# Patient Record
Sex: FEMALE | Race: OTHER | HISPANIC OR LATINO
[De-identification: names, ages, dates, MRNs, and addresses within clinical notes are randomized per-mention and may not be internally consistent; named-entity substitution may affect disease eponyms.]

---

## 2017-01-16 ENCOUNTER — APPOINTMENT (OUTPATIENT)
Dept: RADIOLOGY | Facility: CLINIC | Age: 42
End: 2017-01-16

## 2017-01-16 ENCOUNTER — OUTPATIENT (OUTPATIENT)
Dept: OUTPATIENT SERVICES | Facility: HOSPITAL | Age: 42
LOS: 1 days | End: 2017-01-16
Payer: COMMERCIAL

## 2017-01-16 DIAGNOSIS — Z98.89 OTHER SPECIFIED POSTPROCEDURAL STATES: Chronic | ICD-10-CM

## 2017-01-16 DIAGNOSIS — Z00.8 ENCOUNTER FOR OTHER GENERAL EXAMINATION: ICD-10-CM

## 2017-01-16 PROCEDURE — 71046 X-RAY EXAM CHEST 2 VIEWS: CPT

## 2018-04-10 ENCOUNTER — OUTPATIENT (OUTPATIENT)
Dept: OUTPATIENT SERVICES | Facility: HOSPITAL | Age: 43
LOS: 1 days | End: 2018-04-10
Payer: COMMERCIAL

## 2018-04-10 ENCOUNTER — APPOINTMENT (OUTPATIENT)
Dept: MAMMOGRAPHY | Facility: CLINIC | Age: 43
End: 2018-04-10
Payer: COMMERCIAL

## 2018-04-10 DIAGNOSIS — Z00.8 ENCOUNTER FOR OTHER GENERAL EXAMINATION: ICD-10-CM

## 2018-04-10 DIAGNOSIS — Z98.89 OTHER SPECIFIED POSTPROCEDURAL STATES: Chronic | ICD-10-CM

## 2018-04-10 PROCEDURE — 77063 BREAST TOMOSYNTHESIS BI: CPT | Mod: 26

## 2018-04-10 PROCEDURE — 77067 SCR MAMMO BI INCL CAD: CPT | Mod: 26

## 2018-04-10 PROCEDURE — 77063 BREAST TOMOSYNTHESIS BI: CPT

## 2018-04-10 PROCEDURE — 77067 SCR MAMMO BI INCL CAD: CPT

## 2018-05-08 ENCOUNTER — APPOINTMENT (OUTPATIENT)
Dept: VASCULAR SURGERY | Facility: CLINIC | Age: 43
End: 2018-05-08
Payer: COMMERCIAL

## 2018-05-08 VITALS
HEART RATE: 73 BPM | RESPIRATION RATE: 16 BRPM | HEIGHT: 59.5 IN | BODY MASS INDEX: 20.89 KG/M2 | OXYGEN SATURATION: 100 % | SYSTOLIC BLOOD PRESSURE: 99 MMHG | TEMPERATURE: 98.9 F | WEIGHT: 105 LBS | DIASTOLIC BLOOD PRESSURE: 63 MMHG

## 2018-05-08 VITALS — SYSTOLIC BLOOD PRESSURE: 101 MMHG | DIASTOLIC BLOOD PRESSURE: 66 MMHG

## 2018-05-08 DIAGNOSIS — Z78.9 OTHER SPECIFIED HEALTH STATUS: ICD-10-CM

## 2018-05-08 DIAGNOSIS — Z83.511 FAMILY HISTORY OF GLAUCOMA: ICD-10-CM

## 2018-05-08 DIAGNOSIS — K22.0 ACHALASIA OF CARDIA: ICD-10-CM

## 2018-05-08 PROCEDURE — 99204 OFFICE O/P NEW MOD 45 MIN: CPT

## 2018-05-08 PROCEDURE — 93970 EXTREMITY STUDY: CPT

## 2018-05-24 ENCOUNTER — APPOINTMENT (OUTPATIENT)
Dept: RADIOLOGY | Facility: CLINIC | Age: 43
End: 2018-05-24
Payer: COMMERCIAL

## 2018-05-24 ENCOUNTER — OUTPATIENT (OUTPATIENT)
Dept: OUTPATIENT SERVICES | Facility: HOSPITAL | Age: 43
LOS: 1 days | End: 2018-05-24
Payer: COMMERCIAL

## 2018-05-24 DIAGNOSIS — M54.2 CERVICALGIA: ICD-10-CM

## 2018-05-24 DIAGNOSIS — Z98.89 OTHER SPECIFIED POSTPROCEDURAL STATES: Chronic | ICD-10-CM

## 2018-05-24 PROCEDURE — 72050 X-RAY EXAM NECK SPINE 4/5VWS: CPT | Mod: 26

## 2018-05-24 PROCEDURE — 72050 X-RAY EXAM NECK SPINE 4/5VWS: CPT

## 2018-06-28 ENCOUNTER — OUTPATIENT (OUTPATIENT)
Dept: OUTPATIENT SERVICES | Facility: HOSPITAL | Age: 43
LOS: 1 days | End: 2018-06-28
Payer: COMMERCIAL

## 2018-06-28 ENCOUNTER — APPOINTMENT (OUTPATIENT)
Dept: MRI IMAGING | Facility: CLINIC | Age: 43
End: 2018-06-28
Payer: COMMERCIAL

## 2018-06-28 DIAGNOSIS — Z00.8 ENCOUNTER FOR OTHER GENERAL EXAMINATION: ICD-10-CM

## 2018-06-28 DIAGNOSIS — Z98.89 OTHER SPECIFIED POSTPROCEDURAL STATES: Chronic | ICD-10-CM

## 2018-06-28 PROCEDURE — 72141 MRI NECK SPINE W/O DYE: CPT | Mod: 26

## 2018-06-28 PROCEDURE — 72141 MRI NECK SPINE W/O DYE: CPT

## 2018-08-02 ENCOUNTER — EMERGENCY (EMERGENCY)
Facility: HOSPITAL | Age: 43
LOS: 1 days | Discharge: HOME | End: 2018-08-02
Attending: EMERGENCY MEDICINE
Payer: COMMERCIAL

## 2018-08-02 VITALS
SYSTOLIC BLOOD PRESSURE: 128 MMHG | TEMPERATURE: 98 F | HEART RATE: 77 BPM | DIASTOLIC BLOOD PRESSURE: 88 MMHG | RESPIRATION RATE: 18 BRPM | OXYGEN SATURATION: 100 %

## 2018-08-02 VITALS — WEIGHT: 102.96 LBS | HEIGHT: 60 IN

## 2018-08-02 DIAGNOSIS — Z98.89 OTHER SPECIFIED POSTPROCEDURAL STATES: Chronic | ICD-10-CM

## 2018-08-02 PROCEDURE — 70160 X-RAY EXAM OF NASAL BONES: CPT | Mod: 26

## 2018-08-02 PROCEDURE — 99283 EMERGENCY DEPT VISIT LOW MDM: CPT

## 2018-08-02 PROCEDURE — 70160 X-RAY EXAM OF NASAL BONES: CPT

## 2018-08-02 RX ORDER — ACETAMINOPHEN 500 MG
975 TABLET ORAL ONCE
Qty: 0 | Refills: 0 | Status: COMPLETED | OUTPATIENT
Start: 2018-08-02 | End: 2018-08-02

## 2018-08-02 RX ADMIN — Medication 975 MILLIGRAM(S): at 14:32
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
## 2018-08-02 NOTE — ED STATDOCS - PLAN OF CARE
Tylenol extra strength 2 tablets every 4 hours or Ibuprofen 600mg (3 tablets) every 6 hours as needed for aches, pains. Apply ice to affected area for 15-20 minutes every few hours for the next few days.  Use as much or as frequently as desired. Return immediately to the ER for re-evaluation if your symptoms recur or worsening.

## 2018-08-02 NOTE — ED ADULT NURSE NOTE - NSIMPLEMENTINTERV_GEN_ALL_ED
Implemented All Universal Safety Interventions:  [place] to call system. Call bell, personal items and telephone within reach. Instruct patient to call for assistance. Room bathroom lighting operational. Non-slip footwear when patient is off stretcher. Physically safe environment: no spills, clutter or unnecessary equipment. Stretcher in lowest position, wheels locked, appropriate side rails in place.

## 2018-08-02 NOTE — ED STATDOCS - OBJECTIVE STATEMENT
assuming an upright position after bending over and struck forehead/ nasal bridge on bottow side of a retratable  desktop.  no loc.  has pain across forehead and nasal bridge.  No headahce.  no nose bleeding. assuming an upright position after bending over and struck forehead/ nasal bridge on bottow side of a retratable  desktop.  no loc.  has pain across forehead and nasal bridge.  No headahce.  no nose bleeding.  last tet 3-4 yrs ago.

## 2018-08-02 NOTE — ED STATDOCS - CARE PLAN
Principal Discharge DX:	Contusion of nose, initial encounter  Assessment and plan of treatment:	Tylenol extra strength 2 tablets every 4 hours or Ibuprofen 600mg (3 tablets) every 6 hours as needed for aches, pains. Apply ice to affected area for 15-20 minutes every few hours for the next few days.  Use as much or as frequently as desired. Return immediately to the ER for re-evaluation if your symptoms recur or worsening.

## 2018-11-13 ENCOUNTER — APPOINTMENT (OUTPATIENT)
Dept: VASCULAR SURGERY | Facility: CLINIC | Age: 43
End: 2018-11-13
Payer: COMMERCIAL

## 2018-11-13 VITALS
TEMPERATURE: 98.5 F | SYSTOLIC BLOOD PRESSURE: 107 MMHG | DIASTOLIC BLOOD PRESSURE: 70 MMHG | HEIGHT: 59 IN | OXYGEN SATURATION: 100 % | HEART RATE: 78 BPM | WEIGHT: 104 LBS | BODY MASS INDEX: 20.96 KG/M2

## 2018-11-13 PROCEDURE — 36471 NJX SCLRSNT MLT INCMPTNT VN: CPT | Mod: 50

## 2018-11-26 ENCOUNTER — EMERGENCY (EMERGENCY)
Facility: HOSPITAL | Age: 43
LOS: 1 days | Discharge: HOME | End: 2018-11-26
Attending: EMERGENCY MEDICINE
Payer: COMMERCIAL

## 2018-11-26 VITALS
OXYGEN SATURATION: 98 % | SYSTOLIC BLOOD PRESSURE: 128 MMHG | DIASTOLIC BLOOD PRESSURE: 85 MMHG | HEIGHT: 60 IN | WEIGHT: 102.96 LBS | HEART RATE: 77 BPM | TEMPERATURE: 98 F | RESPIRATION RATE: 18 BRPM

## 2018-11-26 DIAGNOSIS — Z98.89 OTHER SPECIFIED POSTPROCEDURAL STATES: Chronic | ICD-10-CM

## 2018-11-26 PROCEDURE — 99283 EMERGENCY DEPT VISIT LOW MDM: CPT | Mod: 25

## 2018-11-26 PROCEDURE — 73080 X-RAY EXAM OF ELBOW: CPT

## 2018-11-26 PROCEDURE — 99283 EMERGENCY DEPT VISIT LOW MDM: CPT

## 2018-11-26 PROCEDURE — 73080 X-RAY EXAM OF ELBOW: CPT | Mod: 26,LT

## 2018-11-26 RX ORDER — IBUPROFEN 200 MG
600 TABLET ORAL ONCE
Qty: 0 | Refills: 0 | Status: COMPLETED | OUTPATIENT
Start: 2018-11-26 | End: 2018-11-26

## 2018-11-26 RX ORDER — OMEPRAZOLE 10 MG/1
0 CAPSULE, DELAYED RELEASE ORAL
Qty: 0 | Refills: 0 | COMMUNITY

## 2018-11-26 RX ADMIN — Medication 600 MILLIGRAM(S): at 23:54

## 2018-11-26 NOTE — ED ADULT NURSE NOTE - NSIMPLEMENTINTERV_GEN_ALL_ED
Implemented All Fall Risk Interventions:  [place] to call system. Call bell, personal items and telephone within reach. Instruct patient to call for assistance. Room bathroom lighting operational. Non-slip footwear when patient is off stretcher. Physically safe environment: no spills, clutter or unnecessary equipment. Stretcher in lowest position, wheels locked, appropriate side rails in place. Provide visual cue, wrist band, yellow gown, etc. Monitor gait and stability. Monitor for mental status changes and reorient to person, place, and time. Review medications for side effects contributing to fall risk. Reinforce activity limits and safety measures with patient and family.

## 2018-11-26 NOTE — ED ADULT TRIAGE NOTE - CHIEF COMPLAINT QUOTE
Pt with fall down 7 stairs after slipping due to socks. Pt c/o left elbow pain and left hip/buttock pain and pt is noted to have ecchymosis. Pt denies LOC, hitting head. Pt playful upon triage.

## 2018-11-26 NOTE — ED PROVIDER NOTE - OBJECTIVE STATEMENT
PT with no SPMHx presents to the ED with complaint of LT elbow pain and  lt buttock pain s/p fall. PT states that she was home and sliped on the stairs and fell down 6 stairs landing on her bottom and slipped down the rest banging against each step. PT states that she had a sudden onset of non radiating pain. PT states that they have not done anything for the pain prior to coming to the ED. pt states that pain is moderate in nature made worse with walking and moving. PT denies head trama, LOC, n/v, change in vision, fever, chills, numbness tingling, loss of sensation saddle ansatiesi, weakness, HE, loss of control of urine, or bowels IVDA.

## 2018-11-26 NOTE — ED ADULT NURSE NOTE - OBJECTIVE STATEMENT
Assumed care of pt at 2230. Pt AOx4 in no acute distress. Pt c/o left elbow pain and left hip/buttocks pain 5/10. Pt reports she fell down 7 stairs around 2200. Pt states she lost her footing and slipped while wearing socks. Pt denies hitting head, loc, or use of blood thinners.

## 2018-11-26 NOTE — ED PROVIDER NOTE - NS ED ROS FT
ROS: CONTUSIONAL: Denies fever, chills, fatigue, wt loss. HEAD: Denies trauma, HA, Dizziness. EYE: Denies Acute visual changes, diplopia. ENMT: Denies change in hearing, tinnitus, epistaxis, difficulty swallowing, sore throat. CARDIO: Denies CP, palpitations, edema. RESP: Denies Cough, SOB , Diff breathing, hemoptysis. GI: Denies N/V, ABD pain, change in bowel movement. URINARY: Denies difficulty urinating, pelvic pain. MS:  Denies , back pain, weakness, decreased ROM, swelling. NEURO: Denies change in gait, seizures, loss of sensation, dizziness, confusion LOC.  PSY: NO SI/HI.

## 2018-11-26 NOTE — ED PROVIDER NOTE - LOCATION
ttp LT buttock TYRON at hip, strength intact. TTP LT hip no visible abnormality TYRON, pain with rand and tp palpitation

## 2019-04-24 ENCOUNTER — OUTPATIENT (OUTPATIENT)
Dept: OUTPATIENT SERVICES | Facility: HOSPITAL | Age: 44
LOS: 1 days | End: 2019-04-24
Payer: COMMERCIAL

## 2019-04-24 ENCOUNTER — APPOINTMENT (OUTPATIENT)
Dept: MAMMOGRAPHY | Facility: CLINIC | Age: 44
End: 2019-04-24
Payer: COMMERCIAL

## 2019-04-24 DIAGNOSIS — Z12.31 ENCOUNTER FOR SCREENING MAMMOGRAM FOR MALIGNANT NEOPLASM OF BREAST: ICD-10-CM

## 2019-04-24 DIAGNOSIS — Z98.89 OTHER SPECIFIED POSTPROCEDURAL STATES: Chronic | ICD-10-CM

## 2019-04-24 PROCEDURE — 77067 SCR MAMMO BI INCL CAD: CPT | Mod: 26

## 2019-04-24 PROCEDURE — 77063 BREAST TOMOSYNTHESIS BI: CPT

## 2019-04-24 PROCEDURE — 77063 BREAST TOMOSYNTHESIS BI: CPT | Mod: 26

## 2019-04-24 PROCEDURE — 77067 SCR MAMMO BI INCL CAD: CPT

## 2019-05-03 ENCOUNTER — OUTPATIENT (OUTPATIENT)
Dept: OUTPATIENT SERVICES | Facility: HOSPITAL | Age: 44
LOS: 1 days | End: 2019-05-03
Payer: COMMERCIAL

## 2019-05-03 ENCOUNTER — APPOINTMENT (OUTPATIENT)
Dept: ULTRASOUND IMAGING | Facility: CLINIC | Age: 44
End: 2019-05-03
Payer: COMMERCIAL

## 2019-05-03 DIAGNOSIS — Z98.89 OTHER SPECIFIED POSTPROCEDURAL STATES: Chronic | ICD-10-CM

## 2019-05-03 DIAGNOSIS — R92.8 OTHER ABNORMAL AND INCONCLUSIVE FINDINGS ON DIAGNOSTIC IMAGING OF BREAST: ICD-10-CM

## 2019-05-03 PROCEDURE — 76642 ULTRASOUND BREAST LIMITED: CPT | Mod: 26,RT

## 2019-05-03 PROCEDURE — 76642 ULTRASOUND BREAST LIMITED: CPT

## 2019-05-28 ENCOUNTER — APPOINTMENT (OUTPATIENT)
Dept: VASCULAR SURGERY | Facility: CLINIC | Age: 44
End: 2019-05-28
Payer: COMMERCIAL

## 2019-05-28 VITALS
TEMPERATURE: 98.2 F | BODY MASS INDEX: 20.96 KG/M2 | HEART RATE: 78 BPM | SYSTOLIC BLOOD PRESSURE: 115 MMHG | OXYGEN SATURATION: 97 % | WEIGHT: 104 LBS | DIASTOLIC BLOOD PRESSURE: 79 MMHG | HEIGHT: 59 IN

## 2019-05-28 DIAGNOSIS — M79.604 PAIN IN RIGHT LEG: ICD-10-CM

## 2019-05-28 DIAGNOSIS — M79.605 PAIN IN RIGHT LEG: ICD-10-CM

## 2019-05-28 PROCEDURE — 99213 OFFICE O/P EST LOW 20 MIN: CPT

## 2019-05-28 PROCEDURE — 93970 EXTREMITY STUDY: CPT

## 2019-09-16 NOTE — PHYSICAL EXAM
[JVD] : no jugular venous distention  [Carotid Bruits] : no carotid bruits [Normal Breath Sounds] : Normal breath sounds [Normal Heart Sounds] : normal heart sounds [Right Carotid Bruit] : no bruit heard over the right carotid [Left Carotid Bruit] : no bruit heard over the left carotid [2+] : left 2+ [Ankle Swelling (On Exam)] : present [Ankle Swelling Bilaterally] : bilaterally  [Varicose Veins Of Lower Extremities] : present [Ankle Swelling On The Right] : mild [] : bilaterally [de-identified] : awake alert [Ankle Swelling On The Left] : moderate [de-identified] : MOM, PERRLA [de-identified] : bulging veins, no cellulitis

## 2019-09-16 NOTE — PROCEDURE
[FreeTextEntry1] : after informed consent obtained under aseptic technique 1% polidocanol used to inject all varicose veins of both legs. A sterile pressure dressing was applied. The patient tolerated well.

## 2019-09-16 NOTE — HISTORY OF PRESENT ILLNESS
[FreeTextEntry1] : The patient is a 43 y/o female with painful varicose veins to legs. She states they have been present for 1-1/2 years. 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 Pain begins shortly after awakening and worsens throughout the day. She states pain is burning and ache.  She tried compression stockings for a year and feels as though they worsened the appearance of the varicose veins.  She has them scattered through her thighs and calves. She has had 1 child, is a non smoker. She is active and elevates her legs at rest. She maintains adequate hydration. She states her mother and maternal grandfather also had severe varicose veins.

## 2019-09-16 NOTE — ASSESSMENT
[FreeTextEntry1] : The patient is a 41 y/o female with BLE leg pain and visible varicose veins. In the past compression stockings has worsened symptoms. U/S Venous Duplex 5/8/18 was negative for reflux and venous insufficiency. Because of varicose veins with incompetent tributaries will require sclerotherapy. Tolerated well. Veins improved.\par \par -Continue compression \par -RTC as needed

## 2020-03-06 ENCOUNTER — OUTPATIENT (OUTPATIENT)
Dept: OUTPATIENT SERVICES | Facility: HOSPITAL | Age: 45
LOS: 1 days | End: 2020-03-06
Payer: COMMERCIAL

## 2020-03-06 ENCOUNTER — APPOINTMENT (OUTPATIENT)
Dept: ULTRASOUND IMAGING | Facility: CLINIC | Age: 45
End: 2020-03-06
Payer: COMMERCIAL

## 2020-03-06 DIAGNOSIS — Z98.89 OTHER SPECIFIED POSTPROCEDURAL STATES: Chronic | ICD-10-CM

## 2020-03-06 DIAGNOSIS — Z00.00 ENCOUNTER FOR GENERAL ADULT MEDICAL EXAMINATION WITHOUT ABNORMAL FINDINGS: ICD-10-CM

## 2020-03-06 PROCEDURE — 93971 EXTREMITY STUDY: CPT | Mod: 26,RT

## 2020-03-06 PROCEDURE — 93971 EXTREMITY STUDY: CPT

## 2020-03-30 ENCOUNTER — EMERGENCY (EMERGENCY)
Facility: HOSPITAL | Age: 45
LOS: 1 days | Discharge: HOME | End: 2020-03-30
Attending: EMERGENCY MEDICINE
Payer: COMMERCIAL

## 2020-03-30 VITALS — WEIGHT: 102.96 LBS | RESPIRATION RATE: 18 BRPM | OXYGEN SATURATION: 99 % | HEIGHT: 60 IN | HEART RATE: 90 BPM

## 2020-03-30 VITALS — SYSTOLIC BLOOD PRESSURE: 116 MMHG | TEMPERATURE: 98 F | DIASTOLIC BLOOD PRESSURE: 79 MMHG

## 2020-03-30 DIAGNOSIS — Z98.89 OTHER SPECIFIED POSTPROCEDURAL STATES: Chronic | ICD-10-CM

## 2020-03-30 PROCEDURE — 73030 X-RAY EXAM OF SHOULDER: CPT | Mod: 26,LT

## 2020-03-30 PROCEDURE — 99284 EMERGENCY DEPT VISIT MOD MDM: CPT

## 2020-03-30 PROCEDURE — 73060 X-RAY EXAM OF HUMERUS: CPT | Mod: 26,LT

## 2020-03-30 PROCEDURE — 73060 X-RAY EXAM OF HUMERUS: CPT

## 2020-03-30 PROCEDURE — 73030 X-RAY EXAM OF SHOULDER: CPT

## 2020-03-30 RX ORDER — IBUPROFEN 200 MG
600 TABLET ORAL ONCE
Refills: 0 | Status: COMPLETED | OUTPATIENT
Start: 2020-03-30 | End: 2020-03-30

## 2020-03-30 RX ADMIN — Medication 600 MILLIGRAM(S): at 16:37
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
## 2020-03-30 NOTE — ED PROVIDER NOTE - PHYSICAL EXAMINATION
Constitutional - well-developed; well nourished. Head - NCAT. Airway patent. Eyes - PERRL. CV - RRR. no murmur. no edema. Pulm - CTAB. Abd - soft, nt. no rebound. no guarding. Neuro - A&Ox3. strength 5/5 x4. sensation intact x4. normal gait. Skin - No rash. MSK - LROM to L upper shoulder, TTP along L AC, skin intact, warm and dry, cap refill < 2sec, active ROM to 90 degrees, no deformity, strength and sensation intact.
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
## 2020-03-30 NOTE — ED PROVIDER NOTE - NS ED ROS FT
No fever/chills, No photophobia/eye pain/changes in vision, No ear pain/sore throat/dysphagia, No chest pain/palpitations, no SOB/cough/wheeze/stridor, No abdominal pain, No N/V/D, no dysuria/frequency/discharge, L shoulder pain. No neck/back pain, no rash, no changes in neurological status/function.

## 2020-03-30 NOTE — ED PROVIDER NOTE - PATIENT PORTAL LINK FT
You can access the FollowMyHealth Patient Portal offered by [facility] by registering at the following website: [email or web address]. By joining [employer]’s FollowMyHealth portal, you will also be able to view your health information using other applications (apps) compatible with our system.

## 2020-04-06 ENCOUNTER — APPOINTMENT (OUTPATIENT)
Dept: ORTHOPEDIC SURGERY | Facility: CLINIC | Age: 45
End: 2020-04-06
Payer: OTHER MISCELLANEOUS

## 2020-04-06 VITALS — WEIGHT: 104 LBS | BODY MASS INDEX: 20.96 KG/M2 | TEMPERATURE: 98.1 F | HEIGHT: 59 IN

## 2020-04-06 PROCEDURE — 99204 OFFICE O/P NEW MOD 45 MIN: CPT

## 2020-04-10 ENCOUNTER — APPOINTMENT (OUTPATIENT)
Dept: ORTHOPEDIC SURGERY | Facility: CLINIC | Age: 45
End: 2020-04-10

## 2020-04-26 ENCOUNTER — MESSAGE (OUTPATIENT)
Age: 45
End: 2020-04-26

## 2020-05-05 ENCOUNTER — APPOINTMENT (OUTPATIENT)
Dept: ORTHOPEDIC SURGERY | Facility: CLINIC | Age: 45
End: 2020-05-05

## 2020-05-08 ENCOUNTER — APPOINTMENT (OUTPATIENT)
Dept: ORTHOPEDIC SURGERY | Facility: CLINIC | Age: 45
End: 2020-05-08
Payer: OTHER MISCELLANEOUS

## 2020-05-08 VITALS
DIASTOLIC BLOOD PRESSURE: 85 MMHG | HEART RATE: 83 BPM | BODY MASS INDEX: 21.01 KG/M2 | HEIGHT: 60 IN | WEIGHT: 107 LBS | SYSTOLIC BLOOD PRESSURE: 137 MMHG

## 2020-05-08 PROCEDURE — 99214 OFFICE O/P EST MOD 30 MIN: CPT

## 2020-05-08 RX ORDER — METHYLPREDNISOLONE 4 MG/1
4 TABLET ORAL
Qty: 1 | Refills: 0 | Status: COMPLETED | COMMUNITY
Start: 2020-05-08 | End: 2020-05-14
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
## 2020-05-08 NOTE — CONSULT LETTER
[Dear  ___] : Dear ~LUDA, [Consult Letter:] : I had the pleasure of evaluating your patient, [unfilled]. [Please see my note below.] : Please see my note below. [Consult Closing:] : Thank you very much for allowing me to participate in the care of this patient.  If you have any questions, please do not hesitate to contact me. [Sincerely,] : Sincerely, [FreeTextEntry2] : BREANNA BANKS [FreeTextEntry3] : Fernando Madison DO.\par Sports Medicine \par \par Orthopaedic Surgery\par \par [facility]\par \par [address] \par [address]\[place] [address]\par \par [address] [facility] \[address] \[place] [address]\par \par Tel [phone]\par Fax [phone]\par \par
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
## 2020-05-08 NOTE — PHYSICAL EXAM
[de-identified] : Physical Exam:\par General: Well appearing, no acute distress\par Neurologic: A&Ox3, No focal deficits\par Head: NCAT without abrasions, lacerations, or ecchymosis to head, face, or scalp\par Eyes: No scleral icterus, no gross abnormalities\par Respiratory: Equal chest wall expansion bilaterally, no accessory muscle use\par Lymphatic: No lymphadenopathy palpated\par Skin: Warm and dry\par Psychiatric: Normal mood and affect\par \par \par Difficult spine: Limited active range of motion in forward flexion extension rotation and side bending significant pain with attempted passive motion in all planes.  Slightly decreased sensation at all fingertips but does not follow specific dermatome.  Reflexes at C5-C6-C7 are normal.  Motor and sensation are intact throughout C5-T1.\par Left Shoulder\par ·	Inspection/Palpation: no tenderness, swelling or deformities\par ·	Range of Motion: no crepitus with ROM; Active FF 90; ER at side 25; IR to belly; Passive FF 130ER at side 45; IR to belly\par ·	Strength: forward elevation in scapular plane [4/5], internal rotation [4/5], external rotation [4/5], adduction [4/5] and abduction [4/5]\par ·	Stability: no joint instability on provocative testing\par ·	Tests: Rutherford test positive, Neer positive, positive drop arm test secondary to pain, bear hug test positive, Napolean sign negative, cross arm adduction negative, lift off sign positive, hornblowers sign negative, speeds test negative, Yergason's test negative, no bicipital groove tenderness, Blount's Active Compression test negative, whipple test positive bicep's load II test negative\par \par Right Shoulder\par ·	Inspection/Palpation: no tenderness, swelling or deformities\par ·	Range of Motion: full and painless in all planes, no crepitus\par ·	Strength: forward elevation in scapular plane 5/5, internal rotation 5/5, external rotation 5/5, adduction 5/5 and abduction 5/5\par ·	Stability: no joint instability on provocative testing\par ·	Tests: Rutherford test negative, Neer sign negative, negative drop arm test secondary to pain, bear hug test negative, Napolean sign negative, cross arm adduction negative, lift off sign positive, hornblowers sign negative, speeds test negative, Yergason's test negative, no bicipital groove tenderness, Blount's Active Compression test negative [de-identified] : The following radiographs were ordered and read by me during this patients visit. I reviewed each radiograph in detail with the patient and discussed the findings as highlighted below. \par \par 4 views of the left shoulder show no fracture, dislocation or bony lesions. There is no evidence of degenerative change in the glenohumeral and acromioclavicular joints with maintenance of the joint space. Otherwise unremarkable.

## 2020-05-20 ENCOUNTER — APPOINTMENT (OUTPATIENT)
Dept: ORTHOPEDIC SURGERY | Facility: CLINIC | Age: 45
End: 2020-05-20
Payer: OTHER MISCELLANEOUS

## 2020-05-20 VITALS
TEMPERATURE: 97.6 F | SYSTOLIC BLOOD PRESSURE: 136 MMHG | DIASTOLIC BLOOD PRESSURE: 81 MMHG | HEIGHT: 60 IN | HEART RATE: 109 BPM | BODY MASS INDEX: 21.01 KG/M2 | WEIGHT: 107 LBS

## 2020-05-20 PROCEDURE — 20610 DRAIN/INJ JOINT/BURSA W/O US: CPT | Mod: LT

## 2020-05-20 PROCEDURE — 99214 OFFICE O/P EST MOD 30 MIN: CPT | Mod: 25

## 2020-05-22 ENCOUNTER — OUTPATIENT (OUTPATIENT)
Dept: OUTPATIENT SERVICES | Facility: HOSPITAL | Age: 45
LOS: 1 days | End: 2020-05-22
Payer: COMMERCIAL

## 2020-05-22 ENCOUNTER — RESULT REVIEW (OUTPATIENT)
Age: 45
End: 2020-05-22

## 2020-05-22 ENCOUNTER — APPOINTMENT (OUTPATIENT)
Dept: RADIOLOGY | Facility: CLINIC | Age: 45
End: 2020-05-22
Payer: COMMERCIAL

## 2020-05-22 DIAGNOSIS — Z00.00 ENCOUNTER FOR GENERAL ADULT MEDICAL EXAMINATION WITHOUT ABNORMAL FINDINGS: ICD-10-CM

## 2020-05-22 DIAGNOSIS — Z98.89 OTHER SPECIFIED POSTPROCEDURAL STATES: Chronic | ICD-10-CM

## 2020-05-22 PROCEDURE — 71046 X-RAY EXAM CHEST 2 VIEWS: CPT | Mod: 26

## 2020-05-22 PROCEDURE — 71046 X-RAY EXAM CHEST 2 VIEWS: CPT

## 2020-06-04 ENCOUNTER — NON-APPOINTMENT (OUTPATIENT)
Age: 45
End: 2020-06-04

## 2020-06-12 ENCOUNTER — APPOINTMENT (OUTPATIENT)
Dept: ORTHOPEDIC SURGERY | Facility: CLINIC | Age: 45
End: 2020-06-12
Payer: OTHER MISCELLANEOUS

## 2020-06-12 ENCOUNTER — APPOINTMENT (OUTPATIENT)
Dept: ORTHOPEDIC SURGERY | Facility: CLINIC | Age: 45
End: 2020-06-12

## 2020-06-12 VITALS
DIASTOLIC BLOOD PRESSURE: 83 MMHG | BODY MASS INDEX: 20.62 KG/M2 | WEIGHT: 105 LBS | HEIGHT: 60 IN | HEART RATE: 85 BPM | TEMPERATURE: 97.9 F | SYSTOLIC BLOOD PRESSURE: 139 MMHG

## 2020-06-12 PROCEDURE — 99214 OFFICE O/P EST MOD 30 MIN: CPT

## 2020-06-12 PROCEDURE — 72040 X-RAY EXAM NECK SPINE 2-3 VW: CPT

## 2020-06-12 NOTE — PHYSICAL EXAM
[de-identified] : CONSTITUTIONAL:  Patient is a very pleasant individual who is well-nourished and appears stated age. \par PSYCHIATRIC:  Alert and oriented times three and in no apparent distress, and participates with orthopedic evaluation well.\par HEAD:  Atraumatic and  nonsyndromic in appearance.\par EENT: No thyromegaly, EOMI.\par RESPIRATORY:  Respiratory rate is regular, not dyspneic on examination.\par LYMPHATICS:  There is no cervical or axillary lymphadenopathy.\par INTEGUMENTARY:  Skin is clean, dry, and intact about the bilateral upper extremities and cervical spine. \par VASCULAR:   There is brisk capillary refill about the bilateral upper extremities and radial pulses are 2/4. \par NEUROLOGIC:  Negative L'hirmitte, negative Spurling's sign. There are no pathologic reflexes. There is no decrease in sensation of the bilateral upper extremities on Wartenberg pinwheel/manual examination.  Deep tendon reflexes are well-maintained at +2/4 of the bilateral upper extremities and are symmetric. Subjective paresthesias in the tipsof the index middle and ring finger on the left\par MUSCULOSKELETAL:  There is no visible muscular atrophy.  Manual motor strength is well maintained in the bilateral upper extremities.  Cervical range of motion is well maintained.  The patient ambulates in a non-myelopathic manner. Normal secondary orthopaedic exam of bilateral shoulders, elbows and hands.  Elbow flexion and extension, wrist extension, finger flexion and abduction are well maintained.\par Cervical myositis with palpation\par   [de-identified] : Cervical MRI has been [facility] demonstrates very minimal cervical spondylosis. I would not recommend operative management.\par \par X-rays of the cervical spine radiating office that shows normal cervical x-ray
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
## 2020-06-12 NOTE — DISCUSSION/SUMMARY
[de-identified] : based upon her MRI and x-ray findings I would not recommend surgical management at this point in time I think this patient can be best treated with physical modalities such as physical therapy. I also think a physiatry evaluation for nonoperative maximization is reasonable. Patient can followup on an as needed basis given the fact that there is no surgical indications at this point in time

## 2020-06-12 NOTE — REASON FOR VISIT
[Initial Visit] : an initial visit for [Neck Pain] : neck pain [Worker's Compensation] : this visit is related to worker's compensation [FreeTextEntry2] : Cervical pain

## 2020-06-24 ENCOUNTER — APPOINTMENT (OUTPATIENT)
Dept: ORTHOPEDIC SURGERY | Facility: CLINIC | Age: 45
End: 2020-06-24

## 2020-06-25 ENCOUNTER — APPOINTMENT (OUTPATIENT)
Dept: ORTHOPEDIC SURGERY | Facility: CLINIC | Age: 45
End: 2020-06-25
Payer: OTHER MISCELLANEOUS

## 2020-06-25 VITALS
DIASTOLIC BLOOD PRESSURE: 68 MMHG | SYSTOLIC BLOOD PRESSURE: 110 MMHG | HEIGHT: 60 IN | WEIGHT: 105 LBS | HEART RATE: 90 BPM | BODY MASS INDEX: 20.62 KG/M2

## 2020-06-25 VITALS — TEMPERATURE: 98.4 F

## 2020-06-25 PROCEDURE — 99214 OFFICE O/P EST MOD 30 MIN: CPT

## 2020-06-25 RX ORDER — NAPROXEN 500 MG/1
500 TABLET ORAL
Qty: 42 | Refills: 0 | Status: COMPLETED | COMMUNITY
Start: 2020-06-25 | End: 2020-07-16

## 2020-06-28 ENCOUNTER — FORM ENCOUNTER (OUTPATIENT)
Age: 45
End: 2020-06-28

## 2020-07-15 ENCOUNTER — FORM ENCOUNTER (OUTPATIENT)
Age: 45
End: 2020-07-15

## 2020-07-29 ENCOUNTER — APPOINTMENT (OUTPATIENT)
Dept: ORTHOPEDIC SURGERY | Facility: CLINIC | Age: 45
End: 2020-07-29

## 2020-08-12 ENCOUNTER — NON-APPOINTMENT (OUTPATIENT)
Age: 45
End: 2020-08-12

## 2020-08-21 ENCOUNTER — APPOINTMENT (OUTPATIENT)
Dept: ORTHOPEDIC SURGERY | Facility: CLINIC | Age: 45
End: 2020-08-21
Payer: OTHER MISCELLANEOUS

## 2020-08-21 VITALS
WEIGHT: 105 LBS | HEIGHT: 60 IN | HEART RATE: 85 BPM | BODY MASS INDEX: 20.62 KG/M2 | TEMPERATURE: 98.4 F | DIASTOLIC BLOOD PRESSURE: 89 MMHG | SYSTOLIC BLOOD PRESSURE: 128 MMHG

## 2020-08-21 DIAGNOSIS — S49.92XA UNSPECIFIED INJURY OF LEFT SHOULDER AND UPPER ARM, INITIAL ENCOUNTER: ICD-10-CM

## 2020-08-21 DIAGNOSIS — M47.812 SPONDYLOSIS W/OUT MYELOPATHY OR RADICULOPATHY, CERVICAL REGION: ICD-10-CM

## 2020-08-21 PROCEDURE — 99214 OFFICE O/P EST MOD 30 MIN: CPT

## 2020-08-21 PROCEDURE — 73030 X-RAY EXAM OF SHOULDER: CPT | Mod: LT

## 2020-08-21 RX ORDER — NAPROXEN 500 MG/1
500 TABLET ORAL
Qty: 60 | Refills: 2 | Status: COMPLETED | COMMUNITY
Start: 2020-08-21 | End: 2020-11-19

## 2020-08-26 ENCOUNTER — APPOINTMENT (OUTPATIENT)
Dept: PHYSICAL MEDICINE AND REHAB | Facility: CLINIC | Age: 45
End: 2020-08-26
Payer: OTHER MISCELLANEOUS

## 2020-08-26 VITALS
DIASTOLIC BLOOD PRESSURE: 93 MMHG | HEIGHT: 72 IN | WEIGHT: 105 LBS | SYSTOLIC BLOOD PRESSURE: 138 MMHG | BODY MASS INDEX: 14.22 KG/M2

## 2020-08-26 PROCEDURE — 99203 OFFICE O/P NEW LOW 30 MIN: CPT | Mod: 25

## 2020-08-26 PROCEDURE — 20553 NJX 1/MLT TRIGGER POINTS 3/>: CPT

## 2020-08-26 RX ORDER — LIDOCAINE 5 G/100G
5 OINTMENT TOPICAL
Qty: 1 | Refills: 2 | Status: COMPLETED | COMMUNITY
Start: 2020-08-26

## 2020-08-26 RX ORDER — LIDOCAINE 5 G/100G
5 OINTMENT TOPICAL
Qty: 1 | Refills: 2 | Status: ACTIVE | COMMUNITY
Start: 2020-08-26 | End: 1900-01-01

## 2020-08-26 RX ORDER — MELOXICAM 7.5 MG/1
7.5 TABLET ORAL DAILY
Qty: 14 | Refills: 0 | Status: DISCONTINUED | COMMUNITY
Start: 2020-04-06 | End: 2020-08-26

## 2020-08-26 NOTE — PHYSICAL EXAM
[FreeTextEntry1] : PE:\par Gen: In NAD, calm and cooperative\par HEENT: NCAT, Anicteric sclera\par Cardio: Warm extremities, no peripheral edema\par Respiratory: Normal respiratory effort on room air\par GI: No gross abdominal distension\par Skin: no rashes seen on exposed skin\par Psych: Normal affect, intact judgment and insight\par Neuro: Awake, alert and oriented x 3, CN 2-12 grossly intact\par MSK (Neck):\par 	Inspection: no gross swelling identified\par 	Palpation: Tenderness of the left lower cervical paraspinal muscles, and left upper back region, multiple trigger points identified\par 	ROM: Limited exam due to pain, Pain at end cervical extension/flexion/lateral rotation\par 	Strength: 4+/5 strength in LUE due to pain, otherwise 5/5 strength\par 	Reflexes: 2+ Biceps/Triceps/Brachioradialis reflex bilaterally, Posadas’s negative bilaterally\par 	Sensation: Intact to light touch in bilateral upper extremities\par 	Special tests: Spurling’s test negative bilaterally, neer positive on left, lift off positive on left, speed negative on left\par \par
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
## 2020-08-26 NOTE — ASSESSMENT
[FreeTextEntry1] : Ms. Mike is a 45F s/p workplace incident in which she suffers from persistent left shoulder/neck pain, with multiple trigger points identified on physical exam likely secondary to muscle spasms. Patients pain is less likely related to the disc herniation found on MRI C-Spine due to mostly non-radicular pain and nondermatomal distribution of this pain. \par \par -TPI done today\par -OTC TENS machine recommended, information given\par -Will give topical Lidocaine 5% cream, to use 3-4x/day PRN. If not approved, patient told she can get OTC Lidocaine 4% cream\par -Recommend restarting PT, patient currently appealing stoppage of PT. Patient would likely benefit from PT given TPI done today and relief while in program.\par -Gave note about being unable to work for 1 month\par - RTC in 1 month, anticipate return to work around that time

## 2020-08-26 NOTE — DATA REVIEWED
[MRI] : MRI [FreeTextEntry1] : Reviewed both cervical and L shoulder MRIs. MRI C-Spine raveled broad-based center disc herniation at C5-6 with no significant spinal canal or foraminal stenosis.

## 2020-08-26 NOTE — PROCEDURE
[de-identified] : Trigger Point Injections:\par The risks and benefits of the procedure were discussed with the patient and the patient verbally consented to the procedure. Chloroprep was used to prep the area. A 1.5 inch 25g needle was then inserted and a total of 5cc of Lidocaine 1% was injected into five separate trigger points (1cc each) of the trapezius, levator scapulae and rhomboid muscles on the left. The patient tolerated the procedure well without immediate complications.\par

## 2020-09-30 ENCOUNTER — APPOINTMENT (OUTPATIENT)
Dept: PHYSICAL MEDICINE AND REHAB | Facility: CLINIC | Age: 45
End: 2020-09-30
Payer: OTHER MISCELLANEOUS

## 2020-09-30 VITALS — TEMPERATURE: 98 F

## 2020-09-30 PROCEDURE — 99214 OFFICE O/P EST MOD 30 MIN: CPT

## 2020-09-30 RX ORDER — LIDOCAINE 5 G/100G
5 OINTMENT TOPICAL
Qty: 1 | Refills: 2 | Status: ACTIVE | COMMUNITY
Start: 2020-09-30 | End: 1900-01-01

## 2020-09-30 NOTE — REVIEW OF SYSTEMS
[Negative] : Heme/Lymph [FreeTextEntry9] : left>right neck/upper back pain [de-identified] : Intermittent numbness just at fingertips on left hand, nothing down arm

## 2020-09-30 NOTE — ASSESSMENT
[FreeTextEntry1] : Ms. Mike is a 45F who presents for follow up for likely post-traumatic myofascial neck/upper back pain, less likely cervical radiculopathy. She did well with the TPI but still has occasional muscle spasms at night. Will try cyclobenzaprine 5mg Qhs. Patient cleared to return to work for light duty as to not exacerbate her current symptoms. Patient also given Rx for Lidocaine cream as that has helped recently. Patient may return to me as needed. She is in agreement to this plan.\par

## 2020-09-30 NOTE — PHYSICAL EXAM
[FreeTextEntry1] : PE:\par Constitutional: In NAD, calm and cooperative\par HEENT: NCAT, Anicteric sclera, no lid-lag\par Cardio: Extremities appear pink and well perfused\par Respiratory: Normal respiratory effort on room air, no accessory muscle use\par Skin: no rashes seen on exposed skin, no visible abrasions\par Psych: Normal affect, intact judgment and insight\par Neuro: Awake, alert and oriented x 3, see below for focused neurological exam\par MSK (Neck):\par 	Inspection: no gross swelling identified\par 	Palpation: Mild Tenderness of the left lower cervical paraspinals\par 	ROM: Pain at end cervical extension/flexion\par 	Strength: Grossly 5/5 strength in bilateral upper extremities\par 	Sensation: Intact to light touch in bilateral upper extremities\par 	Special tests: Spurling’s test negative bilaterally

## 2020-09-30 NOTE — HISTORY OF PRESENT ILLNESS
[FreeTextEntry1] : Ms. WOLF LLAMAS  is a 45 year year old female who presents for f/u for left sided neck pain. Last seen as initial visit on 8/26/20 where she was given TPI, topical lidocaine, restarted on PT and information on OTC TENS machine. Patient says overall she is much better. Her worse problem now is upper neck spasms that only occur at night, but not nearly as bad as she was at last visit. She says PT was never approved so she never went. \par \par Onset: March 30th, incident with patient as above\par Provocation/Palliative: Turning her head left and right makes pain worse, pain is improved when laying down on side with pillow\par Quality: Tightness/burning sensation, but mainly spasms in the left upper back/neck region\par Radiation: Pain can radiate down the left side of neck into anterior left shoulder. \par Severity:6/10 right now, highest pain 7/10\par Timing: Pain improved with PT, especially with hot compress and TENS unit\par \par \par

## 2020-10-01 ENCOUNTER — OUTPATIENT (OUTPATIENT)
Dept: OUTPATIENT SERVICES | Facility: HOSPITAL | Age: 45
LOS: 1 days | End: 2020-10-01
Payer: COMMERCIAL

## 2020-10-01 ENCOUNTER — APPOINTMENT (OUTPATIENT)
Dept: MAMMOGRAPHY | Facility: CLINIC | Age: 45
End: 2020-10-01
Payer: COMMERCIAL

## 2020-10-01 ENCOUNTER — APPOINTMENT (OUTPATIENT)
Dept: ULTRASOUND IMAGING | Facility: CLINIC | Age: 45
End: 2020-10-01
Payer: COMMERCIAL

## 2020-10-01 DIAGNOSIS — Z98.89 OTHER SPECIFIED POSTPROCEDURAL STATES: Chronic | ICD-10-CM

## 2020-10-01 DIAGNOSIS — R92.8 OTHER ABNORMAL AND INCONCLUSIVE FINDINGS ON DIAGNOSTIC IMAGING OF BREAST: ICD-10-CM

## 2020-10-01 PROCEDURE — 77067 SCR MAMMO BI INCL CAD: CPT | Mod: 26

## 2020-10-01 PROCEDURE — 76641 ULTRASOUND BREAST COMPLETE: CPT

## 2020-10-01 PROCEDURE — 77063 BREAST TOMOSYNTHESIS BI: CPT | Mod: 26

## 2020-10-01 PROCEDURE — 77063 BREAST TOMOSYNTHESIS BI: CPT

## 2020-10-01 PROCEDURE — 76641 ULTRASOUND BREAST COMPLETE: CPT | Mod: 26,50

## 2020-10-01 PROCEDURE — 77067 SCR MAMMO BI INCL CAD: CPT

## 2020-11-11 ENCOUNTER — APPOINTMENT (OUTPATIENT)
Dept: PHYSICAL MEDICINE AND REHAB | Facility: CLINIC | Age: 45
End: 2020-11-11
Payer: OTHER MISCELLANEOUS

## 2020-11-11 VITALS
HEART RATE: 106 BPM | BODY MASS INDEX: 20.62 KG/M2 | DIASTOLIC BLOOD PRESSURE: 72 MMHG | TEMPERATURE: 96.1 F | HEIGHT: 60 IN | WEIGHT: 105 LBS | SYSTOLIC BLOOD PRESSURE: 116 MMHG

## 2020-11-11 PROCEDURE — 99072 ADDL SUPL MATRL&STAF TM PHE: CPT

## 2020-11-11 PROCEDURE — 20553 NJX 1/MLT TRIGGER POINTS 3/>: CPT

## 2020-11-11 PROCEDURE — 99213 OFFICE O/P EST LOW 20 MIN: CPT | Mod: 25

## 2020-11-11 NOTE — ASSESSMENT
[FreeTextEntry1] : Ms. WOLF LLAMAS is a 45 year old  female who presents with chronic left sided neck and upper back pain following an incident with a patient at work. Lidocaine only trigger point injections performed today as they helped her in the past, patient tolerated procedure well. Patient says she is not able to to PT due to insurance issues. Will also try Robaxin and see if it has less sedating effects on her than the cyclobenzaprine. Will also give her Naproxen 500mg BID to use as needed. She will continue her HEP. Patient will continue light duty at work and follow up with me in 1 month.

## 2020-11-11 NOTE — HISTORY OF PRESENT ILLNESS
[FreeTextEntry1] : Ms. WOLF LLAMAS is a 45 year old  female who presents for follow up. She feels a little better, back to light duty at work, but still deals with constant achiness. \par \par Onset: March 30th, incident with patient\par Provocation/Palliative: Turning her head left and right makes pain worse, pain is improved when laying down on side with pillow\par Quality: Tightness/burning sensation, but mainly spasms in the left upper back/neck region\par Radiation: Pain can radiate down the left side of neck into anterior left shoulder. \par Severity improved\par Timing: Pain improved with PT, especially with hot compress and TENS unit

## 2020-11-11 NOTE — PROCEDURE
[de-identified] : ocedure\par Trigger Point Injections:\par The risks and benefits of the procedure were discussed with the patient and the patient verbally consented to the procedure. Chloroprep was used to prep the area. A 1.5 inch 25g needle was then inserted and a total of 4cc of Lidocaine 1% was injected into three separate trigger points (1.33cc each) of the trapezius, levator scapulae and rhomboid muscles on the left. The patient tolerated the procedure well without immediate complications.\par

## 2020-11-11 NOTE — PHYSICAL EXAM
[FreeTextEntry1] : PE:\par Constitutional: In NAD, calm and cooperative\par HEENT: NCAT, Anicteric sclera, no lid-lag\par Cardio: Extremities appear pink and well perfused\par Respiratory: Normal respiratory effort on room air, no accessory muscle use\par Skin: no rashes seen on exposed skin, no visible abrasions\par Psych: Normal affect, intact judgment and insight\par Neuro: Awake, alert and oriented x 3, see below for focused neurological exam\par MSK (Neck):\par 	Inspection: no gross swelling identified\par 	Palpation: Mild Tenderness of the left lower cervical paraspinals and left upper trapezius\par 	ROM: Pain at end cervical extension/flexion

## 2020-12-04 ENCOUNTER — APPOINTMENT (OUTPATIENT)
Dept: PHYSICAL MEDICINE AND REHAB | Facility: CLINIC | Age: 45
End: 2020-12-04
Payer: OTHER MISCELLANEOUS

## 2020-12-04 VITALS
HEART RATE: 82 BPM | SYSTOLIC BLOOD PRESSURE: 135 MMHG | TEMPERATURE: 98 F | DIASTOLIC BLOOD PRESSURE: 85 MMHG | OXYGEN SATURATION: 100 %

## 2020-12-04 DIAGNOSIS — R52 PAIN, UNSPECIFIED: ICD-10-CM

## 2020-12-04 PROCEDURE — 99072 ADDL SUPL MATRL&STAF TM PHE: CPT

## 2020-12-04 PROCEDURE — 99214 OFFICE O/P EST MOD 30 MIN: CPT

## 2020-12-04 NOTE — ASSESSMENT
[FreeTextEntry1] : Ms. WOLF LLAMAS is a 45 year old  female who presents with upper back/neck pain stemming from a patient incident that started on the left side, but now possibly due to overcompensation if affecting her posterior neck and her right upper back. Denies any red flag signs but complains of burning-like pain. Reviewed her previous cervical MRI which did not show any significant stenosis. At this time will recommend:\par - Trial of Gabapentin 100mg Qhs with a titration up to 300mg Qhs, patient advised of side effects including sedation\par - She will continue Robaxin 500mg BID PRN and Naproxen 500mg BID PRN\par - Wrote new PT script to with myofasical pain of the posterior neck and R upper back region\par -Gave another letter for light duty at work until new visit. \par RTC in 3-4 weeks

## 2020-12-04 NOTE — PHYSICAL EXAM
[FreeTextEntry1] : PE:\par Constitutional: In NAD, calm and cooperative\par HEENT: NCAT, Anicteric sclera, no lid-lag\par Cardio: Extremities appear pink and well perfused\par Respiratory: Normal respiratory effort on room air, no accessory muscle use\par Skin: no rashes seen on exposed skin, no visible abrasions\par Psych: Normal affect, intact judgment and insight\par Neuro: Awake, alert and oriented x 3, see below for focused neurological exam\par MSK (Neck):\par 	Inspection: no gross swelling identified\par 	Palpation: Mild Tenderness of the left >right ower cervical paraspinals and left> right upper trapezius\par 	ROM: Pain at end cervical extension/flexion and lateral rotation\par Reflexes: 2+ in b/l UE

## 2020-12-04 NOTE — HISTORY OF PRESENT ILLNESS
[FreeTextEntry1] : Ms. WOLF LLAMAS is a 45 year old female who presents for follow up. Patient s/p repeat TPI on 11/11/20. She feels a little better but didn't get the same amount of relief from the original TPIs, back to light duty at work, but still deals with constant achiness. Had incident on Monday where she was taking off her gown and felt intense pain in left shoulder/upper back region that has since improved but still present. Also feeling pain in posterior neck and R upper back now, possibly due to overcompensation. \par \par Onset: March 30th, incident with patient\par Provocation/Palliative: Turning her head left and right makes pain worse, pain is improved when laying down on side with pillow\par Quality: Tightness/burning sensation, but mainly spasms in the left upper back/neck region\par Radiation: Pain can radiate down the left side of neck into anterior left shoulder. \par Severity improved\par Timing: Pain improved with PT, especially with hot compress and TENS unit

## 2020-12-18 ENCOUNTER — NON-APPOINTMENT (OUTPATIENT)
Age: 45
End: 2020-12-18

## 2021-01-20 ENCOUNTER — APPOINTMENT (OUTPATIENT)
Dept: PHYSICAL MEDICINE AND REHAB | Facility: CLINIC | Age: 46
End: 2021-01-20
Payer: OTHER MISCELLANEOUS

## 2021-01-20 VITALS
BODY MASS INDEX: 20.62 KG/M2 | HEIGHT: 60 IN | SYSTOLIC BLOOD PRESSURE: 128 MMHG | HEART RATE: 83 BPM | RESPIRATION RATE: 16 BRPM | TEMPERATURE: 97.9 F | OXYGEN SATURATION: 100 % | WEIGHT: 105 LBS | DIASTOLIC BLOOD PRESSURE: 77 MMHG

## 2021-01-20 DIAGNOSIS — M25.512 PAIN IN RIGHT SHOULDER: ICD-10-CM

## 2021-01-20 DIAGNOSIS — M54.9 DORSALGIA, UNSPECIFIED: ICD-10-CM

## 2021-01-20 DIAGNOSIS — M25.511 PAIN IN RIGHT SHOULDER: ICD-10-CM

## 2021-01-20 PROCEDURE — 99213 OFFICE O/P EST LOW 20 MIN: CPT | Mod: 25

## 2021-01-20 PROCEDURE — 20553 NJX 1/MLT TRIGGER POINTS 3/>: CPT

## 2021-01-20 PROCEDURE — 99072 ADDL SUPL MATRL&STAF TM PHE: CPT

## 2021-01-20 RX ORDER — METHOCARBAMOL 500 MG/1
500 TABLET, FILM COATED ORAL
Qty: 60 | Refills: 0 | Status: DISCONTINUED | COMMUNITY
Start: 2020-11-11 | End: 2021-01-20

## 2021-01-20 RX ORDER — GABAPENTIN 100 MG/1
100 CAPSULE ORAL
Qty: 90 | Refills: 0 | Status: DISCONTINUED | COMMUNITY
Start: 2020-12-04 | End: 2021-01-20

## 2021-01-20 RX ORDER — CYCLOBENZAPRINE HYDROCHLORIDE 5 MG/1
5 TABLET, FILM COATED ORAL
Qty: 30 | Refills: 1 | Status: DISCONTINUED | COMMUNITY
Start: 2020-09-30 | End: 2021-01-20

## 2021-01-20 NOTE — PHYSICAL EXAM
[FreeTextEntry1] : PE:\par Constitutional: In NAD, calm and cooperative\par HEENT: NCAT, Anicteric sclera, no lid-lag\par Cardio: Extremities appear pink and well perfused\par Respiratory: Normal respiratory effort on room air, no accessory muscle use\par Skin: no rashes seen on exposed skin, no visible abrasions\par Psych: Normal affect, intact judgment and insight\par Neuro: Awake, alert and oriented x 3, see below for focused neurological exam\par MSK (Neck):\par 	Inspection: no gross swelling identified\par 	Palpation:Mild Tenderness of the left >right lower cervical paraspinals and left> right upper trapezius\par 	ROM: Pain at end cervical extension/flexion and lateral rotation\par Reflexes: 2+ in b/l UE

## 2021-01-20 NOTE — ASSESSMENT
[FreeTextEntry1] : Ms. WOLF LLAMAS is a 45 year old female who presents with left>right neck/trapezius muscle spasm, improved with time. Will recommend:\par - S/p trigger point injections today, tolerated procedure well\par - Continue PT 2-3x/week for stretching, strengthening, ROM exercises, HEP and modalities PRN including myofascial release, moist heat, and TENS therapy \par - Gave letter to return to work at full capacity. \par \par RTC in 1 month\par

## 2021-01-20 NOTE — HISTORY OF PRESENT ILLNESS
[FreeTextEntry1] : Ms. WOLF LLAMAS is a 45 year old  female who presents for follow up. Patient was started on a trial of gabapentin, continued on Robaxin and new Rx for PT for neck and upper back was written. She overall feels better. She tried the gabapentin but it made her too sleepy.  She says she has more mobility.\par \par Location: posterior neck area\par Onset: March 30th, incident with patient\par Provocation/Palliative: Turning her head left and right makes pain worse, pain is improved when laying down on side with pillow\par Quality: dull, but mainly spasms in the left upper back/neck region\par Radiation: Pain can radiate down the left side of neck into anterior left shoulder. \par Severity:3-4/10 at this time\par Timing: Pain improved with PT, especially with hot compress and TENS unit \par \par No bowel/bladder changes. No groin numbness.

## 2021-01-20 NOTE — PROCEDURE
[de-identified] : Reason for procedure: Myofascial muscle spasm\par \par Procedure: Trigger Point Injection\par Physician: Dr. Chaney\par Medication injected: Lidocaine 1%\par Sedation medications: None\par Estimated blood loss: None\par Complications: None\par \par Technique: R/B/A to trigger point injection reviewed with patient. The patient is agreeable and wishes to proceed.  Signed consent form to be scanned into EMR.  The patient was placed in prone position and the trigger point of left upper trapezius, cervical paraspinals and levator scapula were identified. The area was prepped in normal sterile fashion with Chloroprep. A 30 gauge 1 inch needle was advanced into the palpable trigger point with reproduction of pain. After negative aspiration of heme, the above medications were injected into the trigger areas. Needle was then removed, bandaid placed over injection sites. There was no complications, the patient was provided with post injection instructions and noted improvement in pain and ROM after injection. \par

## 2021-01-21 ENCOUNTER — FORM ENCOUNTER (OUTPATIENT)
Age: 46
End: 2021-01-21

## 2021-01-22 PROBLEM — M54.9 UPPER BACK PAIN ON LEFT SIDE: Status: ACTIVE | Noted: 2021-01-22

## 2021-02-02 ENCOUNTER — FORM ENCOUNTER (OUTPATIENT)
Age: 46
End: 2021-02-02

## 2021-02-05 ENCOUNTER — APPOINTMENT (OUTPATIENT)
Dept: PHYSICAL MEDICINE AND REHAB | Facility: CLINIC | Age: 46
End: 2021-02-05
Payer: OTHER MISCELLANEOUS

## 2021-02-05 VITALS
SYSTOLIC BLOOD PRESSURE: 139 MMHG | OXYGEN SATURATION: 99 % | DIASTOLIC BLOOD PRESSURE: 87 MMHG | HEART RATE: 86 BPM | TEMPERATURE: 97.8 F

## 2021-02-05 PROCEDURE — 99072 ADDL SUPL MATRL&STAF TM PHE: CPT

## 2021-02-05 PROCEDURE — 99214 OFFICE O/P EST MOD 30 MIN: CPT

## 2021-02-05 NOTE — PHYSICAL EXAM
[FreeTextEntry1] : PE:\par Constitutional: In NAD, calm and cooperative\par HEENT: NCAT, Anicteric sclera, no lid-lag\par Cardio: Extremities appear pink and well perfused\par Respiratory: Normal respiratory effort on room air, no accessory muscle use\par Skin: no rashes seen on exposed skin, no visible abrasions\par Psych: Normal affect, intact judgment and insight\par Neuro: Awake, alert and oriented x 3, see below for focused neurological exam\par MSK (Neck):\par 	Inspection: no gross swelling identified\par 	Palpation:Mild Tenderness of the left >right lower cervical paraspinals and left> right upper trapezius\par 	ROM: Pain at end cervical extension/flexion and lateral rotation, Pain with L shoulder abduction\par Reflexes: 2+ in b/l UE

## 2021-02-05 NOTE — ASSESSMENT
[FreeTextEntry1] : Ms. WOLF LLAMAS is a 45 year old female who presents with chronic myofascial pain after work injury in 3/2020. Denies any red flag signs. Will recommend:\par - Patient to try Skelaxin 400mg Qhs PRN, advised of sedation side effect and not to take with gabapentin or other muscle relaxants\par - Patient can continue Gabapentin 100mg Qdaily, for which she says does help. Advised of side effects including sedation. \par - Patient to restart PT for which I said will likely be the best mode of treatment. \par \par Due to acute pain flare up, recommend patient say out of work until re-evaluated at next appointment on 2/17/21. 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 \par \par A total of 31 minutes was spent on this encounter.

## 2021-02-17 ENCOUNTER — APPOINTMENT (OUTPATIENT)
Dept: PHYSICAL MEDICINE AND REHAB | Facility: CLINIC | Age: 46
End: 2021-02-17
Payer: OTHER MISCELLANEOUS

## 2021-02-17 VITALS
SYSTOLIC BLOOD PRESSURE: 143 MMHG | WEIGHT: 105 LBS | HEART RATE: 85 BPM | DIASTOLIC BLOOD PRESSURE: 91 MMHG | BODY MASS INDEX: 20.62 KG/M2 | HEIGHT: 60 IN

## 2021-02-17 PROCEDURE — 99072 ADDL SUPL MATRL&STAF TM PHE: CPT

## 2021-02-17 PROCEDURE — 99214 OFFICE O/P EST MOD 30 MIN: CPT

## 2021-02-18 NOTE — ASSESSMENT
[FreeTextEntry1] : Ms. WOLF LLAMAS is a 45 year old female who presents with chronic left sided neck/shoulder pain following an incident with a patient at work. Patient's pain has waxed and waned over the last few months. On exam, patient still has tenderness to palpation and restrictions in ROM. Shes denies any red flag signs. Patient's pain is likely due to chronic myofascial pain, but may also be related to underlying shoulder pathology. Will recommend:\par - Patient will follow up with Dr. Madison regarding her L shoulder. \par - She will continue Skelaxin and Naproxen PRN. Patient aware of side effects. \par - Patient will be starting a new position in April that will require much less physial activity. Due to her pain flare, would recommend she stays out of work until re-evaluation on 3/8/21 (and after evaluation by Dr. Madison)\par \par Patient in agreement with this plan. \par \par I spent a total of 30 minutes on this encounter including documentation, face-to-face time and reviewing prior records.

## 2021-02-18 NOTE — HISTORY OF PRESENT ILLNESS
[FreeTextEntry1] : Ms. WOLF LLAMAS is a 45 year old  female who presents for follow up. Patient has felt some relief with the Skelaxin and Naproxen, but still has constant pain. She is no longer on gabapentin. \par \par Location: posterior neck area, L shoulder area\par Onset: March 30th, incident with patient\par Provocation/Palliative: Turning her head left and right makes pain worse, pain is improved when laying down on side with pillow\par Quality: dull, but mainly spasms in the left upper back/neck region\par Radiation: Pain can radiate down the left side of neck into anterior left shoulder. Can have some tingling down to L fingertips intermittently. \par Severity:5-6/10 at this time\par Timing: Pain improved with PT, especially with hot compress and TENS unit but still affecting her daily.\par \par No bowel/bladder changes. No groin numbness.

## 2021-03-08 ENCOUNTER — APPOINTMENT (OUTPATIENT)
Dept: PHYSICAL MEDICINE AND REHAB | Facility: CLINIC | Age: 46
End: 2021-03-08
Payer: OTHER MISCELLANEOUS

## 2021-03-08 ENCOUNTER — APPOINTMENT (OUTPATIENT)
Dept: ORTHOPEDIC SURGERY | Facility: CLINIC | Age: 46
End: 2021-03-08
Payer: OTHER MISCELLANEOUS

## 2021-03-08 VITALS
OXYGEN SATURATION: 99 % | HEART RATE: 83 BPM | SYSTOLIC BLOOD PRESSURE: 134 MMHG | TEMPERATURE: 98 F | HEIGHT: 60 IN | WEIGHT: 106 LBS | DIASTOLIC BLOOD PRESSURE: 82 MMHG | BODY MASS INDEX: 20.81 KG/M2 | RESPIRATION RATE: 14 BRPM

## 2021-03-08 DIAGNOSIS — M75.02 ADHESIVE CAPSULITIS OF LEFT SHOULDER: ICD-10-CM

## 2021-03-08 DIAGNOSIS — M67.922 UNSPECIFIED DISORDER OF SYNOVIUM AND TENDON, LEFT UPPER ARM: ICD-10-CM

## 2021-03-08 DIAGNOSIS — M79.18 MYALGIA, OTHER SITE: ICD-10-CM

## 2021-03-08 DIAGNOSIS — G89.29 MYALGIA, OTHER SITE: ICD-10-CM

## 2021-03-08 DIAGNOSIS — F07.81 POSTCONCUSSIONAL SYNDROME: ICD-10-CM

## 2021-03-08 DIAGNOSIS — M25.512 PAIN IN LEFT SHOULDER: ICD-10-CM

## 2021-03-08 PROCEDURE — 99072 ADDL SUPL MATRL&STAF TM PHE: CPT

## 2021-03-08 PROCEDURE — 99214 OFFICE O/P EST MOD 30 MIN: CPT | Mod: 25

## 2021-03-08 PROCEDURE — 99214 OFFICE O/P EST MOD 30 MIN: CPT

## 2021-03-08 PROCEDURE — 20610 DRAIN/INJ JOINT/BURSA W/O US: CPT | Mod: LT

## 2021-03-08 NOTE — ASSESSMENT
[FreeTextEntry1] : Ms. WOLF LLAMAS is a 45 year old female who presents with chronic left sided neck/shoulder pain following an incident with a patient at work. Patient's pain has waxed and waned over the last few months. On exam, patient still has tenderness to palpation and restrictions in ROM of her L shoulder. Shes denies any red flag signs. Patient's pain is likely due to chronic myofascial pain, but may also be related to underlying shoulder pathology. Will recommend:\par - Patient to follow up with Dr. Madison today regarding specifically her shoulder pain. \par - Patient to continue PT 2-3x/week\par - Patient will continue Naproxen and Skelaxin PRN, renewals given today. Patient aware of risks/side effects. \par \par RTC in 2 weeks. Patient aware of red flag signs including any changes to their bowel/bladder control, groin numbness or new weakness. Patient knows to seek immediate attention by calling 911 or going to nearest ER if these symptoms appear. \par

## 2021-03-08 NOTE — HISTORY OF PRESENT ILLNESS
[FreeTextEntry1] : Ms. WOLF LLAMAS is a 45 year old  female who presents for follow up. At last visit, she was advised to f/u with Dr. Madison regarding her L shoulder, for which she has an appointment later today, and continued on Naproxen and Skelaxin PRN. Overall she is doing better while being off work. Pain only a 2/10. \par \par Location: posterior neck area, L shoulder area\par Onset: March 30th, incident with patient\par Provocation/Palliative: Turning her head left and right makes pain worse, pain is improved when laying down on side with pillow\par Quality: dull, but mainly spasms in the left upper back/neck region\par Radiation: Pain can radiate down the left side of neck into anterior left shoulder. C\par Severity2/10 at this time\par Timing: Pain improved with PT, especially with hot compress and TENS unit but still affecting her daily.\par \par No bowel/bladder changes. No groin numbness. Denies any weakness other than from pain, denies any consistent N/T.

## 2021-03-09 NOTE — PHYSICAL EXAM
[de-identified] : Physical Exam:\par General: Well appearing, no acute distress\par Neurologic: A&Ox3, No focal deficits\par Head: NCAT without abrasions, lacerations, or ecchymosis to head, face, or scalp\par Eyes: No scleral icterus, no gross abnormalities\par Respiratory: Equal chest wall expansion bilaterally, no accessory muscle use\par Lymphatic: No lymphadenopathy palpated\par Skin: Warm and dry\par Psychiatric: Normal mood and affect\par \par \par Cervical spine: Limited active range of motion in forward flexion extension rotation and side bending significant pain with attempted passive motion in all planes.  Slightly decreased sensation at all fingertips but does not follow specific dermatome.  Reflexes at C5-C6-C7 are normal.  Motor and sensation are intact throughout C5-T1. Trapezial muscle spasm.\par \par Left Shoulder\par ·	Inspection/Palpation: no tenderness, swelling or deformities\par ·	Range of Motion: no crepitus with ROM; Active ; ER at side 25; IR to side; Passive , ER at side 45; IR to side\par ·	Strength: forward elevation in scapular plane [4/5], internal rotation [4/5], external rotation [4/5], adduction [4/5] and abduction [4/5]\par ·	Stability: no joint instability on provocative testing\par ·	Tests: Rutherford test positive, Neer positive, positive drop arm test secondary to pain, bear hug test positive, Napolean sign negative, cross arm adduction negative, lift off sign positive, hornblowers sign negative, speeds test negative, Yergason's test negative, no bicipital groove tenderness, Blount's Active Compression test negative, whipple test positive bicep's load II test negative\par \par Left Shoulder\par ·	Inspection/Palpation: no tenderness, swelling or deformities\par ·	Range of Motion: full and painless in all planes, no crepitus\par ·	Strength: forward elevation in scapular plane 5/5, internal rotation 5/5, external rotation 5/5, adduction 5/5 and abduction 5/5\par ·	Stability: no joint instability on provocative testing\par ·	Tests: Rutherford test negative, Neer sign negative, negative drop arm test secondary to pain, bear hug test negative, Napolean sign negative, cross arm adduction negative, lift off sign positive, hornblowers sign negative, speeds test negative, Yergason's test negative, no bicipital groove tenderness, Blount's Active Compression test negative

## 2021-03-09 NOTE — DISCUSSION/SUMMARY
[de-identified] : Ms. LLAMAS is a pleasant 45 year old female who is being seen for follow-up for left shoulder pain. She feels PT is helping her greatly but still reports constant anterolateral shoulder pain with decreased ROM. She saw Dr Piña who educated her that her neck issues are chronic and no surgery is needed but referred her to see physiatry of which she sees regularly. She has received 3 trigger point injection of which the first gaver her the most relief. She feels PT helps her the most and she reports going 3x/week for neck and shoulder complaints. She still admits to pain radiating from her neck with intermittent numbness/tingling. She feels that her left shoulder pain is getting better but slowly but that it is difficult to progress while her neck is still in spasm and in severe pain.\par \par She reports headaches, fogginess, memory issues, lack of concentration and forgetfulness since her head injury but has yet to see a neurologist or concussion specialist. At this time due to her symptoms I would like her to see Dr Montanez for evaluation of concussion since her original TONIA caused head injury. \par \par Regarding her left shoulder on today's exam, due to compensation of her neck issues I believe she has stiffness due to adhesive capsulitis. She also has symptoms on today's exam of biceps tendonitis. She received 3 weeks of relief in the past with the first cortisone injection she received. At this time a second cortisone injection is recommended followed by PT restarting, 1 week from today, 2-3x/week for 6-8 weeks. If she is pain free at that time and has regained her ROM, she will see us in the future on an prn basis and continue to follow with Dr Chaney and Dr Montanez. She agrees with the above plan and all questions were answered.\par

## 2021-03-09 NOTE — PROCEDURE
[de-identified] : Injection: Left shoulder (Subacromial).\par Indication: Adhesive Capsulitis\par \par A discussion was had with the patient regarding this procedure and all questions were answered. All risks, benefits and alternatives were discussed. These included but were not limited to bleeding, infection, and allergic reaction. Alcohol was used to clean the skin, and betadine was used to sterilize and prep the area in the posterior aspect of the left shoulder. Ethyl chloride spray was then used as a topical anesthetic. A 22-gauge needle was used to inject 3cc 1% xylocaine, 2cc 0.25% bupivacaine and 1cc of 40mg/mL triamcinolone acetonide into the left subacromial space. A sterile bandage was then applied. The patient tolerated the procedure well and there were no complications.\par

## 2021-03-09 NOTE — HISTORY OF PRESENT ILLNESS
[de-identified] : Ms. LLAMAS is a pleasant 45 year old female who is being seen for follow-up for left shoulder pain. She feels PT is helping her greatly but still reports constant anterolateral shoulder pain with decreased ROM. She saw Dr Piña who educated her that her neck issues are chronic and no surgery is needed but referred her to see physiatry of which she sees regularly. She has received 3 trigger point injection of which the first gaver her the most relief. She feels PT helps her the most and she reports going 3x/week for neck and shoulder complaints. She still admits to pain radiating from her neck with intermittent numbness/tingling. She feels that her left shoulder pain is getting better but slowly but that it is difficult to progress while her neck is still in spasm and in severe pain.\par \par She reports headaches, fogginess, memory issues, lack of concentration and forgetfullness since her head injury but has yet to see a neurologist or concussion specialist.

## 2021-03-19 ENCOUNTER — APPOINTMENT (OUTPATIENT)
Dept: PHYSICAL MEDICINE AND REHAB | Facility: CLINIC | Age: 46
End: 2021-03-19

## 2021-03-22 ENCOUNTER — APPOINTMENT (OUTPATIENT)
Dept: PHYSICAL MEDICINE AND REHAB | Facility: CLINIC | Age: 46
End: 2021-03-22
Payer: OTHER MISCELLANEOUS

## 2021-03-22 VITALS
TEMPERATURE: 98 F | HEIGHT: 60 IN | OXYGEN SATURATION: 99 % | HEART RATE: 80 BPM | RESPIRATION RATE: 14 BRPM | WEIGHT: 105 LBS | SYSTOLIC BLOOD PRESSURE: 123 MMHG | DIASTOLIC BLOOD PRESSURE: 83 MMHG | BODY MASS INDEX: 20.62 KG/M2

## 2021-03-22 DIAGNOSIS — M62.830 MUSCLE SPASM OF BACK: ICD-10-CM

## 2021-03-22 PROCEDURE — 20553 NJX 1/MLT TRIGGER POINTS 3/>: CPT

## 2021-03-22 PROCEDURE — 99213 OFFICE O/P EST LOW 20 MIN: CPT | Mod: 25

## 2021-03-22 PROCEDURE — 99072 ADDL SUPL MATRL&STAF TM PHE: CPT

## 2021-03-22 NOTE — PHYSICAL EXAM
[FreeTextEntry1] : PE:\par Constitutional: In NAD, calm and cooperative\par HEENT: NCAT, Anicteric sclera, no lid-lag\par Cardio: Extremities appear pink and well perfused\par Respiratory: Normal respiratory effort on room air, no accessory muscle use\par Skin: no rashes seen on exposed skin, no visible abrasions\par Psych: Normal affect, intact judgment and insight\par Neuro: Awake, alert and oriented x 3, see below for focused neurological exam\par MSK (Neck):\par 	Inspection: no gross swelling identified\par 	Palpation:Mild Tenderness of the left >right lower cervical paraspinals and left> right upper trapezius. Trigger points identified of this region\par 	ROM: Pain at end cervical extension/flexion and lateral rotation, Pain with L shoulder abduction

## 2021-03-22 NOTE — PROCEDURE
[de-identified] : Reason for procedure: Myofascial muscle spasm\par \par Procedure: Trigger Point Injection\par Physician: Dr. Chaney\par Medication injected: Lidocaine 1%\par Sedation medications: None\par Estimated blood loss: None\par Complications: None\par \par Technique: R/B/A to trigger point injection reviewed with patient. The patient is agreeable and wishes to proceed.  Signed consent form to be scanned into EMR.  The patient was placed in the seated position and the trigger point of left upper trapezius, cervical paraspinals and levator scapula were identified. The area was prepped in normal sterile fashion with Chloroprep. A 30 gauge 1 inch needle was advanced into the palpable trigger point with reproduction of pain. After negative aspiration of heme, the above medications were injected into the trigger areas. Needle was then removed, bandaid placed over injection sites. There was no complications, the patient was provided with post injection instructions and noted improvement in pain and ROM after injection. \par

## 2021-03-22 NOTE — HISTORY OF PRESENT ILLNESS
[FreeTextEntry1] : Ms. WOLF LLAMAS is a 45 year old  female who presents for follow up. Patient has since had an injection with Dr. Madison which has helped out her L shoulder tremendously. Still having myofascial type pain in the left upper back region. She is to return to work tomorrow. \par \par Location: posterior neck area, L shoulder area\par Onset: March 30th 2020, incident with patient\par Provocation/Palliative: Turning her head left and right makes pain worse, pain is improved when laying down on side with pillow\par Quality: dull, but mainly spasms in the left upper back/neck region\par Radiation: Pain can radiate down the left side of neck into anterior left shoulder. \par Severity: 2/10 at this time\par Timing: Pain improved with PT, especially with hot compress and TENS unit but still affecting her daily.\par \par No bowel/bladder changes. No groin numbness.

## 2021-03-22 NOTE — ASSESSMENT
[FreeTextEntry1] : Ms. WOLF LLAMAS is a 45 year old female who presents with chronic left sided neck/shoulder pain following an incident with a patient at work. Patient's pain has waxed and waned over the last few months. On exam, patient still has tenderness to palpation and restrictions in ROM of her L shoulder. Shes denies any red flag signs. Patient's pain is likely due to chronic myofascial pain, but may also be related to underlying shoulder pathology. Patient saw Dr. Madison who gave her a L shoulder injection recently that helped tremendously with the pain. Will recommend:\par - Patient will continue Naproxen and Skelaxin PRN. Patient aware of risks/side effects. \par - TPI done today, tolerated well\par \par RTC in 6 weeks. Patient should be in new position by then. For now, patient will return to work at her regular position.

## 2021-03-23 ENCOUNTER — FORM ENCOUNTER (OUTPATIENT)
Age: 46
End: 2021-03-23

## 2021-04-28 ENCOUNTER — APPOINTMENT (OUTPATIENT)
Dept: PHYSICAL MEDICINE AND REHAB | Facility: CLINIC | Age: 46
End: 2021-04-28
Payer: OTHER MISCELLANEOUS

## 2021-04-28 VITALS
RESPIRATION RATE: 14 BRPM | OXYGEN SATURATION: 99 % | SYSTOLIC BLOOD PRESSURE: 137 MMHG | TEMPERATURE: 98 F | WEIGHT: 104 LBS | BODY MASS INDEX: 20.42 KG/M2 | HEART RATE: 76 BPM | HEIGHT: 60 IN | DIASTOLIC BLOOD PRESSURE: 82 MMHG

## 2021-04-28 DIAGNOSIS — M54.12 RADICULOPATHY, CERVICAL REGION: ICD-10-CM

## 2021-04-28 DIAGNOSIS — M62.838 OTHER MUSCLE SPASM: ICD-10-CM

## 2021-04-28 PROCEDURE — 99214 OFFICE O/P EST MOD 30 MIN: CPT

## 2021-04-28 PROCEDURE — 99072 ADDL SUPL MATRL&STAF TM PHE: CPT

## 2021-04-28 RX ORDER — NAPROXEN 500 MG/1
500 TABLET ORAL
Qty: 60 | Refills: 0 | Status: ACTIVE | COMMUNITY
Start: 2020-11-11 | End: 1900-01-01

## 2021-04-28 NOTE — ASSESSMENT
[FreeTextEntry1] : Ms. WOLF LLAMAS is a 45 year old female who presents with chronic left sided neck/shoulder pain following an incident with a patient at work. Patient's pain has waxed and waned over the last few months. Still complaining of radiating pain down from her neck to L shoulder, possibly related to cervical radiculopathy. \par - MRI C Spine as pain has been persistent despite conservative treatment and it has been almost 1 year since prior MRI. \par - Patient will continue Naproxen and Skelaxin PRN. Does not take medication daily. Patient aware of risks/side effects. \par - Continue PT 2-3x/week for stretching, strengthening, ROM exercises, HEP and modalities PRN including myofascial release, moist heat, and TENS therapy \par \par RTC in 1 month or after MRI. Patient in agreement with plan.

## 2021-04-28 NOTE — PHYSICAL EXAM
[FreeTextEntry1] : PE:\par Constitutional: In NAD, calm and cooperative\par HEENT: NCAT, Anicteric sclera, no lid-lag\par Cardio: Extremities appear pink and well perfused\par Respiratory: Normal respiratory effort on room air, no accessory muscle use\par Skin: no rashes seen on exposed skin, no visible abrasions\par Psych: Normal affect, intact judgment and insight\par Neuro: Awake, alert and oriented x 3, see below for focused neurological exam\par MSK (Neck):\par 	Inspection: no gross swelling identified\par 	Palpation:Mild Tenderness of the left >right lower cervical paraspinals and left> right upper trapezius. \par 	ROM: Minimal pain at end cervical extension/flexion and lateral rotation.\par 	Strength: 5/5 strength in bilateral upper extremities\par 	Reflexes: 2+ Biceps/Brachioradialis/patella/achilles reflex bilaterally, Posadas/Clonus ’s negative bilaterally\par 	Sensation: Intact to light touch in bilateral upper and lower extremities\par 	Special tests: Spurling’s test negative bilaterally  glasses

## 2021-04-28 NOTE — HISTORY OF PRESENT ILLNESS
[FreeTextEntry1] : Ms. WOLF LLAMAS is a 45 year old  female who presents for follow up. Overall she is doing much better, currently in new more sedentary position, mostly computer work. Still having L sided neck pain, now some radiation down L shoulder to left upper arm.\par \par Location: posterior neck area, L shoulder area\par Onset: March 30th 2020, incident with patient\par Provocation/Palliative: Turning her head left and right makes pain worse, pain is improved when laying down on side with pillow\par Quality: dull, but mainly spasms in the left upper back/neck region\par Radiation: Pain can radiate down the left side of neck into anterior/lateral left shoulder. \par Severity: mild at this time\par Timing: Pain improved with PT, especially with hot compress and TENS unit but still affecting her daily.\par \par No bowel/bladder changes. No groin numbness.

## 2021-05-05 NOTE — ED ADULT TRIAGE NOTE - WEIGHT METHOD
Detail Level: Zone Initiate Treatment: Finacea foam QAM\\nEpiduo Forte QOHS x2 weeks, then if tolerable increase to QHS Continue Regimen: OTC CeraVe foaming facial cleanser Samples Given: Epiduo Forte\\nOTC Eucerin eczema relief cream and advanced repair cream, Neutrogena hydroboost gel, CeraVe AM stated Discontinue Regimen: clindamycin BID as a spot treatment PRN Render In Strict Bullet Format?: No

## 2021-05-09 ENCOUNTER — FORM ENCOUNTER (OUTPATIENT)
Age: 46
End: 2021-05-09

## 2021-05-13 ENCOUNTER — APPOINTMENT (OUTPATIENT)
Dept: ORTHOPEDIC SURGERY | Facility: CLINIC | Age: 46
End: 2021-05-13

## 2021-07-21 ENCOUNTER — APPOINTMENT (OUTPATIENT)
Dept: PHYSICAL MEDICINE AND REHAB | Facility: CLINIC | Age: 46
End: 2021-07-21

## 2021-08-03 NOTE — ED ADULT TRIAGE NOTE - ACCOMPANIED BY
c/o difficulty breathing since sunday. mom reports pt has been taking flovent and ventolin twice daily since sunday. hx CDH. pt tachypneic, wheezing, retracting in triage Self

## 2021-08-18 ENCOUNTER — APPOINTMENT (OUTPATIENT)
Dept: PHYSICAL MEDICINE AND REHAB | Facility: CLINIC | Age: 46
End: 2021-08-18
Payer: OTHER MISCELLANEOUS

## 2021-08-18 VITALS
DIASTOLIC BLOOD PRESSURE: 70 MMHG | HEART RATE: 74 BPM | WEIGHT: 110.6 LBS | RESPIRATION RATE: 14 BRPM | SYSTOLIC BLOOD PRESSURE: 123 MMHG | HEIGHT: 60 IN | BODY MASS INDEX: 21.71 KG/M2

## 2021-08-18 DIAGNOSIS — M54.2 CERVICALGIA: ICD-10-CM

## 2021-08-18 DIAGNOSIS — M75.82 OTHER SHOULDER LESIONS, LEFT SHOULDER: ICD-10-CM

## 2021-08-18 PROCEDURE — 99072 ADDL SUPL MATRL&STAF TM PHE: CPT

## 2021-08-18 PROCEDURE — 99214 OFFICE O/P EST MOD 30 MIN: CPT

## 2021-08-18 RX ORDER — METAXALONE 400 MG/1
400 TABLET ORAL
Qty: 30 | Refills: 0 | Status: ACTIVE | COMMUNITY
Start: 2021-02-05 | End: 1900-01-01

## 2021-08-18 NOTE — ASSESSMENT
[FreeTextEntry1] : Ms. WOLF LLAMAS is a 46 year old female who presents with chronic left sided neck/shoulder pain following an incident with a patient at work. Patient's pain has waxed and waned over the last few months. On exam, patient still has tenderness to palpation and restrictions in ROM of her L shoulder. Shes denies any red flag signs. Patient's pain is likely due to chronic myofascial pain and left subacromial bursitis. Will recommend:\par - Patient will continue Naproxen and Skelaxin PRN. Gave refill of Skelaxin today.Patient aware of risks/side effects. \par -Will order MRI L Shoulder to evaluate for worsening RTC tendonitis vs new tear\par \par RTC in a few weeks, after MRI.  Patient in agreement with this plan.

## 2021-08-18 NOTE — PHYSICAL EXAM
[FreeTextEntry1] : PE:\par Constitutional: In NAD, calm and cooperative\par HEENT: NCAT, Anicteric sclera, no lid-lag\par Cardio: Extremities appear pink and well perfused\par Respiratory: Normal respiratory effort on room air, no accessory muscle use\par Skin: no rashes seen on exposed skin, no visible abrasions\par Psych: Normal affect, intact judgment and insight\par Neuro: Awake, alert and oriented x 3, see below for focused neurological exam\par MSK:\par 	Inspection: no gross swelling identified\par 	Palpation:Mild Tenderness of the left >right lower cervical paraspinals and left> right upper trapezius and anterior shoulder region.\par 	ROM: Pain at end cervical extension/flexion and lateral rotation, Pain with L shoulder abduction and internal rotation:\par Special tests: hawkin's sign positive on left, neer's test positive on left

## 2021-08-18 NOTE — HISTORY OF PRESENT ILLNESS
[FreeTextEntry1] : Ms. WOLF LLAMAS is a 46 year old female who presents for follow up. She occasionally takes Skelaxin 1x/week. She has been taking Tylenol as well with some relief. She now complains of worsening left shoulder/left upper back pain. No specific new incidents. Denies any other new symptoms. \par \par Location: posterior left neck area, L shoulder area\par Onset: March 30th 2020, incident with patient\par Provocation/Palliative: Turning her head left and right makes pain worse, pain is improved when laying down on side with pillow\par Quality: dull, but mainly spasms in the left upper back/neck region\par Radiation: Pain can radiate down the left side of neck into anterior/lateral left shoulder. \par Severity: mild at this time, mild to moderate to with arm movement\par Timing: Pain improved with PT in past, especially with hot compress and TENS unit but still affecting her daily.

## 2021-08-25 NOTE — ED ADULT NURSE NOTE - CAS DISCH ACCOMP BY
Received phone call from Selina.  She's been taking sertraline 50mg daily and reports this is going well.  She's ready to start tapering down on clorazepate.  She has 15mg tablets and would like to go slow with taper.  Would propose reducing to 11.25mg daily (this could be done using 7.5mg tablets or continuing with 15mg - she prefers to continue with 15mg tablets).  Rx pended and routed to primary care physician - she is due for a refill within the next week.  Dr. Wallace - please sign if in agreement.  I've asked Selina to call with an update in 1 month or sooner if needed.    Rachana Huggins, PharmD, [facility]  Medication Therapy Management Provider  Pager: [phone]     
Thank you     Yes, I will approve the taper prescription     José Miguel Wallace MD, MD   
Self

## 2021-09-08 ENCOUNTER — EMERGENCY (EMERGENCY)
Facility: HOSPITAL | Age: 46
LOS: 1 days | Discharge: HOME | End: 2021-09-08
Attending: EMERGENCY MEDICINE
Payer: COMMERCIAL

## 2021-09-08 VITALS
OXYGEN SATURATION: 98 % | HEART RATE: 86 BPM | HEIGHT: 60 IN | SYSTOLIC BLOOD PRESSURE: 121 MMHG | TEMPERATURE: 98 F | DIASTOLIC BLOOD PRESSURE: 75 MMHG | RESPIRATION RATE: 18 BRPM | WEIGHT: 110.01 LBS

## 2021-09-08 DIAGNOSIS — Z98.89 OTHER SPECIFIED POSTPROCEDURAL STATES: Chronic | ICD-10-CM

## 2021-09-08 PROCEDURE — 99283 EMERGENCY DEPT VISIT LOW MDM: CPT

## 2021-09-08 PROCEDURE — 73130 X-RAY EXAM OF HAND: CPT | Mod: 26,RT

## 2021-09-08 PROCEDURE — 99283 EMERGENCY DEPT VISIT LOW MDM: CPT | Mod: 25

## 2021-09-08 PROCEDURE — 73130 X-RAY EXAM OF HAND: CPT

## 2021-09-08 RX ORDER — IBUPROFEN 200 MG
1 TABLET ORAL
Qty: 28 | Refills: 0
Start: 2021-09-08 | End: 2021-09-14

## 2021-09-08 NOTE — ED PROVIDER NOTE - CLINICAL SUMMARY MEDICAL DECISION MAKING FREE TEXT BOX
Pt with R first finger pain after lifting tray x 1 week. Neurovascularly intact. No snuff box tenderness. No actute findings on xray. Advised to follow up with Dr. Jones

## 2021-09-08 NOTE — ED PROVIDER NOTE - ATTENDING CONTRIBUTION TO CARE
I, Thomas Anaya, performed a face to face bedside interview with this patient regarding history of present illness, review of symptoms and relevant past medical, social and family history.  I completed an independent physical examination. I have communicated the patient’s plan of care and disposition with the ACP.  46 year old female presents with R thumb pain x 9 days. States it started after lifting a tray of food. C/o pain to the R thenar eminence, worse with movement and using the thumb. No redness, swelling  Gen: NAD, well appearing  Neuro: no focal deficits  msk: ttp to the R thenar eminence, FROM, MS 5/5 with flexion, extension and opposition, no laxity of the UCl ligament  Pt improved, likely muscular strain, stable for dc

## 2021-09-08 NOTE — ED PROVIDER NOTE - CARE PROVIDER_API CALL
Olivia Jones)  Orthopedics  [address] [place] [address], [address]  [place] [address]  Phone: [phone]  Fax: [phone]  Follow Up Time:

## 2021-09-08 NOTE — ED ADULT TRIAGE NOTE - CHIEF COMPLAINT QUOTE
Pt c/o pain to right thumb.  Pt injured finger while lifting food tray from a pt's bedside while at work 10 days ago.  No obvious deformity noted.

## 2021-09-08 NOTE — ED PROVIDER NOTE - PHYSICAL EXAMINATION
MSK: Full ROM digits 1-5 R hand. No snuff box tenderness.    vasc; cap refill < 2 secs. + 2 radial pulses.

## 2021-09-08 NOTE — ED PROVIDER NOTE - PATIENT PORTAL LINK FT
You can access the FollowMyHealth Patient Portal offered by [facility] by registering at the following website: [email or web address]. By joining [employer]’s FollowMyHealth portal, you will also be able to view your health information using other applications (apps) compatible with our system.

## 2021-09-08 NOTE — ED PROVIDER NOTE - OBJECTIVE STATEMENT
Pt is a 45 y/o f presenting to ED c/o R first finger pain x 1 week. Pt states she was lifting a food tray last week when she twisted her R thumb and developed pain to the area. Pt has not taken any medication today in attempt to alleviate symptoms. Pt has no other complaints at this time. Denies paresthesias, chills, cold digits, loss of function of finger / hand

## 2021-10-05 ENCOUNTER — OUTPATIENT (OUTPATIENT)
Dept: OUTPATIENT SERVICES | Facility: HOSPITAL | Age: 46
LOS: 1 days | End: 2021-10-05
Payer: SELF-PAY

## 2021-10-05 ENCOUNTER — APPOINTMENT (OUTPATIENT)
Dept: ULTRASOUND IMAGING | Facility: CLINIC | Age: 46
End: 2021-10-05
Payer: COMMERCIAL

## 2021-10-05 ENCOUNTER — APPOINTMENT (OUTPATIENT)
Dept: MAMMOGRAPHY | Facility: CLINIC | Age: 46
End: 2021-10-05
Payer: COMMERCIAL

## 2021-10-05 DIAGNOSIS — Z98.89 OTHER SPECIFIED POSTPROCEDURAL STATES: Chronic | ICD-10-CM

## 2021-10-05 DIAGNOSIS — Z00.8 ENCOUNTER FOR OTHER GENERAL EXAMINATION: ICD-10-CM

## 2021-10-05 PROCEDURE — 77067 SCR MAMMO BI INCL CAD: CPT | Mod: 26

## 2021-10-05 PROCEDURE — 76641 ULTRASOUND BREAST COMPLETE: CPT | Mod: 26,50

## 2021-10-05 PROCEDURE — 76641 ULTRASOUND BREAST COMPLETE: CPT

## 2021-10-05 PROCEDURE — 77067 SCR MAMMO BI INCL CAD: CPT

## 2021-10-05 PROCEDURE — 77063 BREAST TOMOSYNTHESIS BI: CPT

## 2021-10-05 PROCEDURE — 77063 BREAST TOMOSYNTHESIS BI: CPT | Mod: 26

## 2023-04-03 NOTE — HISTORY OF PRESENT ILLNESS
[FreeTextEntry1] : Ms. WOLF LLAAMS is a 45 year old female who presents for follow up. At last visit, she received TPI, continued on PT and advised to return to work at full capacity. Patient initially felt better but since returning to full capacity, says her muscle spasms have worsened. She has also noticed intermittent numbness in her fingertips in her L hand. She is pending restarting PT. \par \par Location: posterior neck area, L shoulder area\par Onset: March 30th, incident with patient\par Provocation/Palliative: Turning her head left and right makes pain worse, pain is improved when laying down on side with pillow\par Quality: dull, but mainly spasms in the left upper back/neck region\par Radiation: Pain can radiate down the left side of neck into anterior left shoulder. Can have some tingling down to L fingertips intermittently. \par Severity:5-6/10 at this time\par Timing: Pain improved with PT, especially with hot compress and TENS unit \par \par No bowel/bladder changes. No groin numbness.  Acitretin Pregnancy And Lactation Text: This medication is Pregnancy Category X and should not be given to women who are pregnant or may become pregnant in the future. This medication is excreted in breast milk.

## 2023-07-01 ENCOUNTER — TRANSCRIPTION ENCOUNTER (OUTPATIENT)
Age: 48
End: 2023-07-01

## 2023-07-01 ENCOUNTER — APPOINTMENT (OUTPATIENT)
Dept: MAMMOGRAPHY | Facility: CLINIC | Age: 48
End: 2023-07-01
Payer: COMMERCIAL

## 2023-07-01 ENCOUNTER — APPOINTMENT (OUTPATIENT)
Dept: RADIOLOGY | Facility: CLINIC | Age: 48
End: 2023-07-01
Payer: COMMERCIAL

## 2023-07-01 ENCOUNTER — OUTPATIENT (OUTPATIENT)
Dept: OUTPATIENT SERVICES | Facility: HOSPITAL | Age: 48
LOS: 1 days | End: 2023-07-01
Payer: COMMERCIAL

## 2023-07-01 ENCOUNTER — APPOINTMENT (OUTPATIENT)
Dept: ULTRASOUND IMAGING | Facility: CLINIC | Age: 48
End: 2023-07-01
Payer: COMMERCIAL

## 2023-07-01 DIAGNOSIS — Z98.89 OTHER SPECIFIED POSTPROCEDURAL STATES: Chronic | ICD-10-CM

## 2023-07-01 PROCEDURE — 77067 SCR MAMMO BI INCL CAD: CPT | Mod: 26

## 2023-07-01 PROCEDURE — 76641 ULTRASOUND BREAST COMPLETE: CPT

## 2023-07-01 PROCEDURE — 77063 BREAST TOMOSYNTHESIS BI: CPT | Mod: 26

## 2023-07-01 PROCEDURE — 72100 X-RAY EXAM L-S SPINE 2/3 VWS: CPT | Mod: 26

## 2023-07-01 PROCEDURE — 76641 ULTRASOUND BREAST COMPLETE: CPT | Mod: 26,50

## 2023-07-01 PROCEDURE — 77063 BREAST TOMOSYNTHESIS BI: CPT

## 2023-07-01 PROCEDURE — 77067 SCR MAMMO BI INCL CAD: CPT

## 2023-07-01 PROCEDURE — 72100 X-RAY EXAM L-S SPINE 2/3 VWS: CPT

## 2023-07-18 ENCOUNTER — OUTPATIENT (OUTPATIENT)
Dept: OUTPATIENT SERVICES | Facility: HOSPITAL | Age: 48
LOS: 1 days | End: 2023-07-18
Payer: COMMERCIAL

## 2023-07-18 ENCOUNTER — APPOINTMENT (OUTPATIENT)
Dept: ULTRASOUND IMAGING | Facility: CLINIC | Age: 48
End: 2023-07-18
Payer: COMMERCIAL

## 2023-07-18 DIAGNOSIS — Z00.8 ENCOUNTER FOR OTHER GENERAL EXAMINATION: ICD-10-CM

## 2023-07-18 DIAGNOSIS — Z98.89 OTHER SPECIFIED POSTPROCEDURAL STATES: Chronic | ICD-10-CM

## 2023-07-18 PROCEDURE — 76642 ULTRASOUND BREAST LIMITED: CPT

## 2023-07-18 PROCEDURE — 76642 ULTRASOUND BREAST LIMITED: CPT | Mod: 26,50

## 2023-07-26 ENCOUNTER — APPOINTMENT (OUTPATIENT)
Dept: ULTRASOUND IMAGING | Facility: CLINIC | Age: 48
End: 2023-07-26
Payer: COMMERCIAL

## 2023-07-26 ENCOUNTER — OUTPATIENT (OUTPATIENT)
Dept: OUTPATIENT SERVICES | Facility: HOSPITAL | Age: 48
LOS: 1 days | End: 2023-07-26
Payer: COMMERCIAL

## 2023-07-26 DIAGNOSIS — Z98.89 OTHER SPECIFIED POSTPROCEDURAL STATES: Chronic | ICD-10-CM

## 2023-07-26 DIAGNOSIS — R92.8 OTHER ABNORMAL AND INCONCLUSIVE FINDINGS ON DIAGNOSTIC IMAGING OF BREAST: ICD-10-CM

## 2023-07-26 PROCEDURE — 88305 TISSUE EXAM BY PATHOLOGIST: CPT | Mod: 26

## 2023-07-26 PROCEDURE — A4648: CPT

## 2023-07-26 PROCEDURE — 88305 TISSUE EXAM BY PATHOLOGIST: CPT

## 2023-07-26 PROCEDURE — 77065 DX MAMMO INCL CAD UNI: CPT | Mod: 26,LT

## 2023-07-26 PROCEDURE — 77065 DX MAMMO INCL CAD UNI: CPT

## 2023-07-26 PROCEDURE — 19083 BX BREAST 1ST LESION US IMAG: CPT | Mod: LT

## 2023-07-26 PROCEDURE — 19083 BX BREAST 1ST LESION US IMAG: CPT

## 2024-01-11 ENCOUNTER — OUTPATIENT (OUTPATIENT)
Dept: OUTPATIENT SERVICES | Facility: HOSPITAL | Age: 49
LOS: 1 days | End: 2024-01-11
Payer: COMMERCIAL

## 2024-01-11 ENCOUNTER — APPOINTMENT (OUTPATIENT)
Dept: ULTRASOUND IMAGING | Facility: CLINIC | Age: 49
End: 2024-01-11
Payer: COMMERCIAL

## 2024-01-11 DIAGNOSIS — Z98.89 OTHER SPECIFIED POSTPROCEDURAL STATES: Chronic | ICD-10-CM

## 2024-01-11 DIAGNOSIS — Z00.8 ENCOUNTER FOR OTHER GENERAL EXAMINATION: ICD-10-CM

## 2024-01-11 PROCEDURE — 76642 ULTRASOUND BREAST LIMITED: CPT

## 2024-01-11 PROCEDURE — 76642 ULTRASOUND BREAST LIMITED: CPT | Mod: 26,RT

## 2024-07-24 ENCOUNTER — APPOINTMENT (OUTPATIENT)
Dept: MAMMOGRAPHY | Facility: CLINIC | Age: 49
End: 2024-07-24
Payer: COMMERCIAL

## 2024-07-24 ENCOUNTER — APPOINTMENT (OUTPATIENT)
Dept: ULTRASOUND IMAGING | Facility: CLINIC | Age: 49
End: 2024-07-24
Payer: COMMERCIAL

## 2024-07-24 PROCEDURE — 77067 SCR MAMMO BI INCL CAD: CPT | Mod: 26

## 2024-07-24 PROCEDURE — 76641 ULTRASOUND BREAST COMPLETE: CPT | Mod: 26,50

## 2024-07-24 PROCEDURE — 77063 BREAST TOMOSYNTHESIS BI: CPT | Mod: 26

## 2024-09-24 ENCOUNTER — EMERGENCY (EMERGENCY)
Facility: HOSPITAL | Age: 49
LOS: 1 days | Discharge: HOME | End: 2024-09-24
Attending: STUDENT IN AN ORGANIZED HEALTH CARE EDUCATION/TRAINING PROGRAM
Payer: COMMERCIAL

## 2024-09-24 VITALS
WEIGHT: 118.17 LBS | HEART RATE: 69 BPM | OXYGEN SATURATION: 99 % | SYSTOLIC BLOOD PRESSURE: 147 MMHG | TEMPERATURE: 98 F | RESPIRATION RATE: 18 BRPM | DIASTOLIC BLOOD PRESSURE: 79 MMHG

## 2024-09-24 DIAGNOSIS — Z98.89 OTHER SPECIFIED POSTPROCEDURAL STATES: Chronic | ICD-10-CM

## 2024-09-24 PROCEDURE — 87340 HEPATITIS B SURFACE AG IA: CPT

## 2024-09-24 PROCEDURE — 84520 ASSAY OF UREA NITROGEN: CPT

## 2024-09-24 PROCEDURE — 87389 HIV-1 AG W/HIV-1&-2 AB AG IA: CPT

## 2024-09-24 PROCEDURE — 86803 HEPATITIS C AB TEST: CPT

## 2024-09-24 PROCEDURE — 82247 BILIRUBIN TOTAL: CPT

## 2024-09-24 PROCEDURE — 99284 EMERGENCY DEPT VISIT MOD MDM: CPT

## 2024-09-24 PROCEDURE — 86706 HEP B SURFACE ANTIBODY: CPT

## 2024-09-24 PROCEDURE — 99283 EMERGENCY DEPT VISIT LOW MDM: CPT

## 2024-09-24 PROCEDURE — 82565 ASSAY OF CREATININE: CPT

## 2024-09-24 PROCEDURE — 87521 HEPATITIS C PROBE&RVRS TRNSC: CPT

## 2024-09-24 PROCEDURE — 85025 COMPLETE CBC W/AUTO DIFF WBC: CPT

## 2024-09-24 PROCEDURE — 80053 COMPREHEN METABOLIC PANEL: CPT

## 2024-09-24 PROCEDURE — 82248 BILIRUBIN DIRECT: CPT

## 2024-09-24 PROCEDURE — 36415 COLL VENOUS BLD VENIPUNCTURE: CPT

## 2024-09-24 NOTE — ED PROVIDER NOTE - PATIENT PORTAL LINK FT
You can access the FollowMyHealth Patient Portal offered by [facility] by registering at the following website: [email or web address]. By joining [employer]’s FollowMyHealth portal, you will also be able to view your health information using other applications (apps) compatible with our system.

## 2024-09-24 NOTE — ED PROVIDER NOTE - CLINICAL SUMMARY MEDICAL DECISION MAKING FREE TEXT BOX
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 She was wearing protective glasses and mask but still felt the blood get in her left eye. Used the eye wash in OR prior to coming to ED. No open abrasions on face. Denies other complaints. Reports vaccinations for tetanus and hepatitis up todate   Ap - overall low risk exposure. discussed with patient, still prefers to have PEP packet. will ask for source patient draw. will draw baseline labs and f/u with [facility]

## 2024-09-24 NOTE — ED ADULT NURSE NOTE - OBJECTIVE STATEMENT
Pt presents to ED because she states she was at work in the OR and a patients blood specimen splashed her in the face, pt states she washed her face and eyes. Pt denies n,v,d,chestpain/sob. Respirations are even and unlabored. A&Ox4.

## 2024-09-24 NOTE — ED PROVIDER NOTE - OBJECTIVE STATEMENT
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 She was wearing protective glasses and mask but still felt the blood get in her left eye. Used the eye wash in OR prior to coming to ED. No open abrasions on face. Denies other complaints. Reports vaccinations for tetanus and hepatitis up todate

## 2024-09-25 LAB
ALBUMIN SERPL ELPH-MCNC: 4.4 G/DL — SIGNIFICANT CHANGE UP (ref 3.3–5)
ALP SERPL-CCNC: 105 U/L — SIGNIFICANT CHANGE UP (ref 40–120)
ALT FLD-CCNC: 14 U/L — SIGNIFICANT CHANGE UP (ref 10–45)
ANION GAP SERPL CALC-SCNC: 12 MMOL/L — SIGNIFICANT CHANGE UP (ref 5–17)
AST SERPL-CCNC: 15 U/L — SIGNIFICANT CHANGE UP (ref 10–40)
BASOPHILS # BLD AUTO: 0.04 K/UL — SIGNIFICANT CHANGE UP (ref 0–0.2)
BASOPHILS NFR BLD AUTO: 0.6 % — SIGNIFICANT CHANGE UP (ref 0–2)
BILIRUB DIRECT SERPL-MCNC: 0.1 MG/DL — SIGNIFICANT CHANGE UP (ref 0–0.3)
BILIRUB INDIRECT FLD-MCNC: 0.1 MG/DL — LOW (ref 0.2–1.2)
BILIRUB SERPL-MCNC: 0.2 MG/DL — SIGNIFICANT CHANGE UP (ref 0.2–1.2)
BILIRUB SERPL-MCNC: 0.2 MG/DL — SIGNIFICANT CHANGE UP (ref 0.2–1.2)
BUN SERPL-MCNC: 19 MG/DL — SIGNIFICANT CHANGE UP (ref 7–23)
CALCIUM SERPL-MCNC: 9.5 MG/DL — SIGNIFICANT CHANGE UP (ref 8.4–10.5)
CHLORIDE SERPL-SCNC: 106 MMOL/L — SIGNIFICANT CHANGE UP (ref 96–108)
CO2 SERPL-SCNC: 25 MMOL/L — SIGNIFICANT CHANGE UP (ref 22–31)
CREAT SERPL-MCNC: 0.8 MG/DL — SIGNIFICANT CHANGE UP (ref 0.5–1.3)
EGFR: 89 ML/MIN/1.73M2 — SIGNIFICANT CHANGE UP
EOSINOPHIL # BLD AUTO: 0.08 K/UL — SIGNIFICANT CHANGE UP (ref 0–0.5)
EOSINOPHIL NFR BLD AUTO: 1.1 % — SIGNIFICANT CHANGE UP (ref 0–6)
GLUCOSE SERPL-MCNC: 101 MG/DL — HIGH (ref 70–99)
HBV SURFACE AB SER-ACNC: REACTIVE
HBV SURFACE AG SER-ACNC: SIGNIFICANT CHANGE UP
HCT VFR BLD CALC: 39.9 % — SIGNIFICANT CHANGE UP (ref 34.5–45)
HCV AB S/CO SERPL IA: 0.14 S/CO — SIGNIFICANT CHANGE UP (ref 0–0.99)
HCV AB SERPL-IMP: SIGNIFICANT CHANGE UP
HCV RNA FLD QL NAA+PROBE: SIGNIFICANT CHANGE UP
HCV RNA SPEC QL PROBE+SIG AMP: SIGNIFICANT CHANGE UP
HGB BLD-MCNC: 13.1 G/DL — SIGNIFICANT CHANGE UP (ref 11.5–15.5)
IMM GRANULOCYTES NFR BLD AUTO: 0.3 % — SIGNIFICANT CHANGE UP (ref 0–0.9)
LYMPHOCYTES # BLD AUTO: 1.95 K/UL — SIGNIFICANT CHANGE UP (ref 1–3.3)
LYMPHOCYTES # BLD AUTO: 27.7 % — SIGNIFICANT CHANGE UP (ref 13–44)
MCHC RBC-ENTMCNC: 27.5 PG — SIGNIFICANT CHANGE UP (ref 27–34)
MCHC RBC-ENTMCNC: 32.8 GM/DL — SIGNIFICANT CHANGE UP (ref 32–36)
MCV RBC AUTO: 83.8 FL — SIGNIFICANT CHANGE UP (ref 80–100)
MONOCYTES # BLD AUTO: 0.49 K/UL — SIGNIFICANT CHANGE UP (ref 0–0.9)
MONOCYTES NFR BLD AUTO: 7 % — SIGNIFICANT CHANGE UP (ref 2–14)
NEUTROPHILS # BLD AUTO: 4.45 K/UL — SIGNIFICANT CHANGE UP (ref 1.8–7.4)
NEUTROPHILS NFR BLD AUTO: 63.3 % — SIGNIFICANT CHANGE UP (ref 43–77)
PLATELET # BLD AUTO: 250 K/UL — SIGNIFICANT CHANGE UP (ref 150–400)
POTASSIUM SERPL-MCNC: 4.6 MMOL/L — SIGNIFICANT CHANGE UP (ref 3.5–5.3)
POTASSIUM SERPL-SCNC: 4.6 MMOL/L — SIGNIFICANT CHANGE UP (ref 3.5–5.3)
PROT SERPL-MCNC: 7.1 G/DL — SIGNIFICANT CHANGE UP (ref 6–8.3)
RBC # BLD: 4.76 M/UL — SIGNIFICANT CHANGE UP (ref 3.8–5.2)
RBC # FLD: 14.5 % — SIGNIFICANT CHANGE UP (ref 10.3–14.5)
SODIUM SERPL-SCNC: 143 MMOL/L — SIGNIFICANT CHANGE UP (ref 135–145)
WBC # BLD: 7.03 K/UL — SIGNIFICANT CHANGE UP (ref 3.8–10.5)
WBC # FLD AUTO: 7.03 K/UL — SIGNIFICANT CHANGE UP (ref 3.8–10.5)

## 2024-10-03 ENCOUNTER — APPOINTMENT (OUTPATIENT)
Dept: ORTHOPEDIC SURGERY | Facility: CLINIC | Age: 49
End: 2024-10-03
Payer: COMMERCIAL

## 2024-10-03 VITALS
DIASTOLIC BLOOD PRESSURE: 76 MMHG | HEIGHT: 60 IN | WEIGHT: 110 LBS | BODY MASS INDEX: 21.6 KG/M2 | HEART RATE: 88 BPM | SYSTOLIC BLOOD PRESSURE: 126 MMHG

## 2024-10-03 DIAGNOSIS — M25.522 PAIN IN LEFT ELBOW: ICD-10-CM

## 2024-10-03 PROCEDURE — 73080 X-RAY EXAM OF ELBOW: CPT | Mod: LT

## 2024-10-03 PROCEDURE — 99203 OFFICE O/P NEW LOW 30 MIN: CPT

## 2024-10-03 RX ORDER — MELOXICAM 15 MG/1
15 TABLET ORAL
Qty: 21 | Refills: 0 | Status: ACTIVE | COMMUNITY
Start: 2024-10-03 | End: 1900-01-01

## 2024-10-03 NOTE — HISTORY OF PRESENT ILLNESS
[de-identified] : 10/3/2024: Kymberly is a pleasant 49-year-old right-hand-dominant female presents the office today for evaluation of left elbow pain.  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  Patient states that she has had pain for the past 4 weeks and elbow.  She denies any injury.  The pain is activity related and worse with heavy lifting.  She has not had any formal treatment.  The patient denies any fevers, chills, sweats, recent illnesses, numbness, tingling, weakness, or pain elsewhere at this time.

## 2024-10-03 NOTE — PHYSICAL EXAM
[de-identified] : General: Awake, alert, no acute distress, Patient was cooperative and appropriate during the examination.  The patient's weight is of normal weight for height and age.  Walks without an antalgic gait.   Left Elbow Exam: Physical exam of the elbow demonstrates normal skin without signs of skin changes or abnormalities. No erythema, warmth, or joint effusion appreciated.   Sensation intact light touch C5-T1 Palpable radial pulse Radial/ulnar/median/axillary/musculocutaneous/AIN/PIN nerves grossly intact  Range of motion: Flexion-Extension [ ] Pronation-Supination [ ]  Palpation: Exquisitely tender to palpation over the lateral epicondyle and common extensor origin Moderately tender palpation over the medial epicondyle Not tender to palpation over the radial head Not tender to palpation over the olecranon Not tender to palpation over the distal biceps insertion Not tender to palpation over the distal triceps insertion Not tender to palpation over the cubital tunnel  Strength testing: Elbow Flexion 5/5 Elbow Extension 5/5 Pronation 5/5 Supination 5/5  Special Tests: No varus/valgus laxity at 0 and 30 degrees of elbow flexion Moderate pain with resisted wrist/finger extension and forearm supination No pain with resisted wrist/finger flexion and forearm pronation Negative hook test Negative Tinel's over the carpal and cubital tunnels [de-identified] : X-rays including 3 views of the left elbow were obtained in the office on 10/3/2024 reviewed the patient.  No acute fracture or dislocation.  No arthritis.

## 2024-10-03 NOTE — DISCUSSION/SUMMARY
[de-identified] : Assessment: 49-year-old female with left elbow pain secondary to lateral epicondylitis, mild medial epicondylitis  Plan: I had a long discussion with the patient today regarding the nature of their diagnosis and treatment plan. We discussed the risks and benefits of no treatment as well as nonoperative and operative treatments.  I reviewed the patient's x-rays which are negative for fracture.  On examination patient has pain over the lateral epicondyle, and less over the medial epicondyle.  At this time I recommend conservative treatment of the patient's condition with modalities including rest, ice, heat, anti-inflammatory medications, activity modifications, and home stretching and strengthening exercises.  A prescription for meloxicam was sent to the patient's pharmacy today.  I discussed with the patient the risks and benefits associated with NSAID use. GI precautions were discussed.  A referral for physical therapy was provided to begin working on exercises to help improve their strength and function.  Discussed obtaining encounter counterforce strap.  Recommend follow-up in 8 weeks for repeat clinical assessment.  If symptoms persist we will consider an MRI versus an injection.  The patient verbalizes their understanding and agrees with the plan.  All questions were answered to their satisfaction.

## 2024-11-18 ENCOUNTER — APPOINTMENT (OUTPATIENT)
Dept: ORTHOPEDIC SURGERY | Facility: CLINIC | Age: 49
End: 2024-11-18

## 2025-01-23 ENCOUNTER — OUTPATIENT (OUTPATIENT)
Dept: OUTPATIENT SERVICES | Facility: HOSPITAL | Age: 50
LOS: 1 days | End: 2025-01-23
Payer: COMMERCIAL

## 2025-01-23 ENCOUNTER — APPOINTMENT (OUTPATIENT)
Dept: RADIOLOGY | Facility: CLINIC | Age: 50
End: 2025-01-23

## 2025-01-23 DIAGNOSIS — Z98.89 OTHER SPECIFIED POSTPROCEDURAL STATES: Chronic | ICD-10-CM

## 2025-01-23 DIAGNOSIS — Z00.8 ENCOUNTER FOR OTHER GENERAL EXAMINATION: ICD-10-CM

## 2025-01-23 PROCEDURE — 73100 X-RAY EXAM OF WRIST: CPT

## 2025-01-23 PROCEDURE — 73100 X-RAY EXAM OF WRIST: CPT | Mod: 26,RT

## 2025-07-30 ENCOUNTER — APPOINTMENT (OUTPATIENT)
Dept: MAMMOGRAPHY | Facility: CLINIC | Age: 50
End: 2025-07-30
Payer: COMMERCIAL

## 2025-07-30 ENCOUNTER — APPOINTMENT (OUTPATIENT)
Dept: ULTRASOUND IMAGING | Facility: CLINIC | Age: 50
End: 2025-07-30
Payer: COMMERCIAL

## 2025-07-30 ENCOUNTER — OUTPATIENT (OUTPATIENT)
Dept: OUTPATIENT SERVICES | Facility: HOSPITAL | Age: 50
LOS: 1 days | End: 2025-07-30
Payer: COMMERCIAL

## 2025-07-30 DIAGNOSIS — Z12.31 ENCOUNTER FOR SCREENING MAMMOGRAM FOR MALIGNANT NEOPLASM OF BREAST: ICD-10-CM

## 2025-07-30 DIAGNOSIS — Z98.89 OTHER SPECIFIED POSTPROCEDURAL STATES: Chronic | ICD-10-CM

## 2025-07-30 DIAGNOSIS — Z00.8 ENCOUNTER FOR OTHER GENERAL EXAMINATION: ICD-10-CM

## 2025-07-30 PROCEDURE — 76641 ULTRASOUND BREAST COMPLETE: CPT

## 2025-07-30 PROCEDURE — 77067 SCR MAMMO BI INCL CAD: CPT | Mod: 26

## 2025-07-30 PROCEDURE — 76641 ULTRASOUND BREAST COMPLETE: CPT | Mod: 26,50

## 2025-07-30 PROCEDURE — 77063 BREAST TOMOSYNTHESIS BI: CPT | Mod: 26

## 2025-07-30 PROCEDURE — 77063 BREAST TOMOSYNTHESIS BI: CPT

## 2025-07-30 PROCEDURE — 77067 SCR MAMMO BI INCL CAD: CPT

## 2025-09-17 ENCOUNTER — APPOINTMENT (OUTPATIENT)
Dept: HEART AND VASCULAR | Facility: CLINIC | Age: 50
End: 2025-09-17